# Patient Record
Sex: FEMALE | Race: WHITE | Employment: STUDENT | URBAN - NONMETROPOLITAN AREA
[De-identification: names, ages, dates, MRNs, and addresses within clinical notes are randomized per-mention and may not be internally consistent; named-entity substitution may affect disease eponyms.]

---

## 2021-05-18 ENCOUNTER — HOSPITAL ENCOUNTER (EMERGENCY)
Age: 26
Discharge: HOME | End: 2021-05-18
Payer: COMMERCIAL

## 2021-05-18 VITALS
OXYGEN SATURATION: 98 % | HEART RATE: 56 BPM | SYSTOLIC BLOOD PRESSURE: 130 MMHG | TEMPERATURE: 96.9 F | DIASTOLIC BLOOD PRESSURE: 74 MMHG | RESPIRATION RATE: 16 BRPM | WEIGHT: 230 LBS

## 2021-05-18 DIAGNOSIS — H66.91 ACUTE OTITIS MEDIA, RIGHT: ICD-10-CM

## 2021-05-18 DIAGNOSIS — H60.393 INFECTIVE OTITIS EXTERNA OF BOTH EARS: Primary | ICD-10-CM

## 2021-05-18 PROCEDURE — 99202 OFFICE O/P NEW SF 15 MIN: CPT | Performed by: NURSE PRACTITIONER

## 2021-05-18 PROCEDURE — 99203 OFFICE O/P NEW LOW 30 MIN: CPT

## 2021-05-18 RX ORDER — NEOMYCIN SULFATE, POLYMYXIN B SULFATE AND HYDROCORTISONE 10; 3.5; 1 MG/ML; MG/ML; [USP'U]/ML
3 SUSPENSION/ DROPS AURICULAR (OTIC) 4 TIMES DAILY
Qty: 1 BOTTLE | Refills: 0 | Status: SHIPPED | OUTPATIENT
Start: 2021-05-18 | End: 2021-05-25

## 2021-05-18 RX ORDER — AMOXICILLIN AND CLAVULANATE POTASSIUM 875; 125 MG/1; MG/1
1 TABLET, FILM COATED ORAL 2 TIMES DAILY
Qty: 20 TABLET | Refills: 0 | Status: SHIPPED | OUTPATIENT
Start: 2021-05-18 | End: 2021-05-28

## 2021-05-18 ASSESSMENT — ENCOUNTER SYMPTOMS
COUGH: 0
NAUSEA: 0
TROUBLE SWALLOWING: 0
DIARRHEA: 0
VOMITING: 0
RHINORRHEA: 0
EYE DISCHARGE: 0
EYE REDNESS: 0
SORE THROAT: 1
SHORTNESS OF BREATH: 0

## 2021-05-18 ASSESSMENT — PAIN DESCRIPTION - PAIN TYPE: TYPE: ACUTE PAIN

## 2021-05-18 ASSESSMENT — PAIN DESCRIPTION - LOCATION: LOCATION: EAR

## 2021-05-18 NOTE — ED PROVIDER NOTES
drugs.    PHYSICAL EXAM     ED TRIAGE VITALS  BP: 130/74, Temp: 96.9 °F (36.1 °C), Pulse: 56, Resp: 16, SpO2: 98 %  Physical Exam  Vitals and nursing note reviewed. Constitutional:       General: She is not in acute distress. Appearance: Normal appearance. She is well-developed. She is not ill-appearing, toxic-appearing or diaphoretic. HENT:      Head: Normocephalic and atraumatic. Right Ear: Hearing, ear canal and external ear normal. Swelling and tenderness present. No drainage. A middle ear effusion is present. No mastoid tenderness. No hemotympanum. Tympanic membrane is erythematous and bulging. Tympanic membrane is not perforated. Left Ear: Hearing, ear canal and external ear normal. Tenderness present. No drainage or swelling. A middle ear effusion is present. No mastoid tenderness. No hemotympanum. Tympanic membrane is not perforated, erythematous or bulging. Nose: Nose normal.      Mouth/Throat:      Mouth: Mucous membranes are moist.      Pharynx: Oropharynx is clear. Uvula midline. Tonsils: No tonsillar abscesses. Eyes:      General: No scleral icterus. Conjunctiva/sclera: Conjunctivae normal.      Right eye: Right conjunctiva is not injected. No hemorrhage. Left eye: Left conjunctiva is not injected. No hemorrhage. Neck:      Thyroid: No thyromegaly. Trachea: Trachea normal.   Cardiovascular:      Rate and Rhythm: Normal rate and regular rhythm. No extrasystoles are present. Chest Wall: PMI is not displaced. Heart sounds: Normal heart sounds. No murmur heard. No friction rub. No gallop. Pulmonary:      Effort: Pulmonary effort is normal. No respiratory distress. Breath sounds: Normal breath sounds. Musculoskeletal:      Cervical back: Normal range of motion and neck supple. Lymphadenopathy:      Head:      Right side of head: No submental, submandibular, tonsillar or occipital adenopathy.       Left side of head: No submental, submandibular, tonsillar or occipital adenopathy. Cervical: No cervical adenopathy. Upper Body:      Right upper body: No supraclavicular adenopathy. Left upper body: No supraclavicular adenopathy. Skin:     General: Skin is warm and dry. Capillary Refill: Capillary refill takes less than 2 seconds. Coloration: Skin is not pale. Findings: No rash. Comments: Skin warm and dry to touch, no rashes noted on exposed surfaces. Neurological:      Mental Status: She is alert and oriented to person, place, and time. She is not disoriented. Psychiatric:         Mood and Affect: Mood normal.         Behavior: Behavior is cooperative. DIAGNOSTIC RESULTS   Labs: No results found for this visit on 05/18/21. IMAGING:  No orders to display     URGENT CARE COURSE:     Vitals:    05/18/21 1825   BP: 130/74   Pulse: 56   Resp: 16   Temp: 96.9 °F (36.1 °C)   TempSrc: Temporal   SpO2: 98%   Weight: 230 lb (104.3 kg)       Medications - No data to display  PROCEDURES:  None  FINALIMPRESSION      1. Infective otitis externa of both ears    2. Acute otitis media, right        DISPOSITION/PLAN   DISPOSITION Decision To Discharge 05/18/2021 06:31:39 PM  Nontoxic, no distress. Exam consistent with bilateral otitis externa, right otitis media. TMs intact. Medications as prescribed. Daily yogurt/probiotic. If symptoms worsen return or go to ER. PATIENT REFERRED TO:  Palak Geiger DO  [address]  [phone]      Establish care as needed. Medications as prescribed. OTC medication for pain. If worse return or go to ER.     DISCHARGE MEDICATIONS:  Discharge Medication List as of 5/18/2021  6:32 PM      START taking these medications    Details   amoxicillin-clavulanate (AUGMENTIN) 875-125 MG per tablet Take 1 tablet by mouth 2 times daily for 10 days, Disp-20 tablet, R-0Normal      neomycin-polymyxin-hydrocortisone (CORTISPORIN) 3.5-47799-3 otic suspension Place 3 drops into both ears 4 times daily for 7 days, Disp-1 Bottle, R-0Normal           Discharge Medication List as of 5/18/2021  6:32 PM          GISELA Tobias CNP, APRN - CNP  05/18/21 1842

## 2023-10-04 ENCOUNTER — NURSE ONLY (OUTPATIENT)
Dept: LAB | Age: 28
End: 2023-10-04

## 2023-10-06 LAB
C. TRACHOMATIS DNA,THIN PREP: NEGATIVE
N. GONORRHOEAE DNA, THIN PREP: NEGATIVE
SOURCE: NORMAL
SOURCE: NORMAL
TRICHOMONAS VAGINALI, MOLECULAR: NEGATIVE

## 2023-10-12 LAB — CYTOLOGY THIN PREP PAP: NORMAL

## 2023-11-28 ENCOUNTER — NURSE ONLY (OUTPATIENT)
Dept: LAB | Age: 28
End: 2023-11-28

## 2023-11-30 LAB
BACTERIA UR CULT: ABNORMAL
ORGANISM: ABNORMAL

## 2024-01-09 ENCOUNTER — HOSPITAL ENCOUNTER (OUTPATIENT)
Dept: PEDIATRICS | Age: 29
Discharge: HOME | End: 2024-01-09
Payer: COMMERCIAL

## 2024-01-09 ENCOUNTER — HOSPITAL ENCOUNTER (OUTPATIENT)
Age: 29
Discharge: HOME | End: 2024-01-11
Attending: PEDIATRICS
Payer: COMMERCIAL

## 2024-01-09 VITALS
OXYGEN SATURATION: 98 % | DIASTOLIC BLOOD PRESSURE: 71 MMHG | TEMPERATURE: 97.1 F | SYSTOLIC BLOOD PRESSURE: 137 MMHG | RESPIRATION RATE: 18 BRPM | HEART RATE: 80 BPM | WEIGHT: 293 LBS | BODY MASS INDEX: 41.02 KG/M2 | HEIGHT: 71 IN

## 2024-01-09 DIAGNOSIS — Z87.74 HISTORY OF ATRIAL SEPTAL DEFECT REPAIR: Primary | ICD-10-CM

## 2024-01-09 DIAGNOSIS — Z87.74 HISTORY OF ATRIAL SEPTAL DEFECT REPAIR: ICD-10-CM

## 2024-01-09 PROCEDURE — 93325 DOPPLER ECHO COLOR FLOW MAPG: CPT

## 2024-01-09 PROCEDURE — 99214 OFFICE O/P EST MOD 30 MIN: CPT

## 2024-01-09 NOTE — DISCHARGE INSTRUCTIONS
Continue care with OB/GYN.  Call if questions or concerns, Dr. Clarke PH: [phone].  Discharged from Fetal-Cardiology clinic.

## 2024-01-30 ENCOUNTER — HOSPITAL ENCOUNTER (OUTPATIENT)
Dept: PHYSICAL THERAPY | Age: 29
Setting detail: THERAPIES SERIES
Discharge: HOME | End: 2024-01-30
Payer: COMMERCIAL

## 2024-01-30 PROCEDURE — 97161 PT EVAL LOW COMPLEX 20 MIN: CPT

## 2024-01-30 PROCEDURE — 97110 THERAPEUTIC EXERCISES: CPT

## 2024-01-30 PROCEDURE — 97530 THERAPEUTIC ACTIVITIES: CPT

## 2024-01-30 NOTE — PROGRESS NOTES
** PLEASE SIGN, DATE AND TIME CERTIFICATION BELOW AND RETURN TO [facility] OUTPATIENT REHABILITATION (FAX #: [phone]).  ATTEST/CO-SIGN IF ACCESSING VIA [employer].  THANK YOU.**    I certify that I have examined the patient below and determined that Physical Medicine and Rehabilitation service is necessary and that I approve the established plan of care for up to 90 days or as specifically noted.  Attestation, signature or co-signature of physician indicates approval of certification requirements.    ________________________ ____________ __________  Physician Signature   Date   Time  [facility]  PHYSICAL THERAPY  OUTPATIENT REHABILITATION - SPECIALIZED THERAPY SERVICES  [x] PELVIC HEALTH EVALUATION  [] DAILY NOTE  [] PROGRESS NOTE [] DISCHARGE NOTE    Date: 2024  Patient Name:  Tiff XIAO Current  : 1995  MRN: [id number]  CSN: [id number]    Referring Practitioner Wanda Hamilton MD   Diagnosis R10.3 Lower abdominal pain, unspecified  M25.552 Pain in left hip  Z33.1 Pregnant state, incidental   Treatment Diagnosis M62.58 - Muscle Wasting and Atrophy, nec, other site  M62.89 - Other Specified Disorders of Muscle  M62.81 - Muscle Weakness (Generalized), R10.30 - Lower Abdominal Pain, Unspecified, and R29.3 - Abnormal Posture   Date of Evaluation 24    Additional Pertinent History Atrial septal defect, ASD repair       Functional Outcome Measure Used Modified PAMELA   Functional Outcome Score 14/50 (24)       Insurance: Primary: Payor: [employer] /  /  / ,   Secondary:    Authorization Information: No precert required   Visit # 1, 1/10 for progress note   Visits Allowed: 30 VISITS CALENDAR YEAR -SOFTMAX - NONE USED    Recertification Date: 24   Physician Follow-Up:    Physician Orders:    History of Present Illness: Pt is 25 weeks pregnant and has reports of lower abdominal and L hip pain for the last 1 month. States that she will be lying in bed and \"get stuck\" and her

## 2024-02-23 ENCOUNTER — CARE COORDINATION (OUTPATIENT)
Dept: OTHER | Facility: CLINIC | Age: 29
End: 2024-02-23

## 2024-02-23 NOTE — CARE COORDINATION
review  Genetic Screening completed/reviewed-normal  MFM referral needed? No  Fetal movement-every day  Red flags: bleeding/leaking  Labor signs and symptoms-None  Birth planning:  planned   Maternity Classes? Already signed up       Patient given an opportunity to ask questions and does not have any further questions or concerns at this time. Were discharge instructions available to patient? Yes. Reviewed appropriate site of care based on symptoms and resources available to patient including: PCP  Specialist  Benefits related nurse triage line  Urgent care clinics  When to call 911  Trendlraging. The patient agrees to contact the OB/Gyn office for questions related to their healthcare.    Plan for follow-up call in 10-14 days based on severity of symptoms and risk factors.  Plan for next call: symptom management-any new s/s  self management-any new concerns?  follow up appointment-how did repeat OBV go?     Goals        Attends follow-up appointments as directed      Educate and encourage importance of FU for prevention of complications or disease;  Assess the patient's relationship with a PCP and next FU visit scheduled;  Discuss importance of adherence to treatment plan and follow up visits;  Identify any barriers in transportation or access to FU appointments.   Assist patient with making FU appointments as needed;   It's also a good idea to know your test results.  Keep a list of the medicines you take.       Patient will identify signs and symptoms requiring evaluation and intervention      Demonstrate how client is to evaluate contraction activity after discharge (e.g., lying down, tilted to the side with a pillow to the back, placing fingertips on the fundus for approximately 1 hour to note hardening or tightening of the uterus).  Identify signs and/or symptoms that should be reported immediately to the healthcare provider (e.g, sustained uterine contractions, clear drainage from vagina,

## 2024-02-26 ENCOUNTER — CARE COORDINATION (OUTPATIENT)
Dept: OTHER | Facility: CLINIC | Age: 29
End: 2024-02-26

## 2024-02-26 NOTE — CARE COORDINATION
ACM contacted patient to assist her in enrolling in Be Well, then Be Well With Baby (BWWB).   Patient was able to successfully enroll in Be Well and the BWWB program. She is appreciative of the call and assistance and will begin completing all modules through the BWWB program. She is agreeable to a routine [facility] follow-up call in about 4 weeks, and will call ACM with any needs in the meantime.       MARILYN Poon, RN  Associate Care Manager   Cell: [phone]  [email or web address]

## 2024-03-05 ENCOUNTER — HOSPITAL ENCOUNTER (OUTPATIENT)
Age: 29
Discharge: HOME | End: 2024-03-05
Payer: COMMERCIAL

## 2024-03-06 ENCOUNTER — APPOINTMENT (OUTPATIENT)
Dept: PHYSICAL THERAPY | Age: 29
End: 2024-03-06
Payer: COMMERCIAL

## 2024-03-09 PROCEDURE — S9442 BIRTHING CLASS: HCPCS

## 2024-03-13 ENCOUNTER — HOSPITAL ENCOUNTER (OUTPATIENT)
Dept: PHYSICAL THERAPY | Age: 29
Setting detail: THERAPIES SERIES
Discharge: HOME | End: 2024-03-13
Payer: COMMERCIAL

## 2024-03-13 PROCEDURE — 97530 THERAPEUTIC ACTIVITIES: CPT

## 2024-03-13 PROCEDURE — 97140 MANUAL THERAPY 1/> REGIONS: CPT

## 2024-03-13 NOTE — PROGRESS NOTES
as follows:    []  Hold pending physician visit  []  Discharge    Time In 8:00   Time Out 9:00   Timed Code Minutes: 60 min   Total Treatment Time: 60 min       Electronically Signed by: Shelby Cash PT

## 2024-03-20 ENCOUNTER — HOSPITAL ENCOUNTER (OUTPATIENT)
Dept: PHYSICAL THERAPY | Age: 29
Setting detail: THERAPIES SERIES
Discharge: HOME | End: 2024-03-20
Payer: COMMERCIAL

## 2024-03-20 PROCEDURE — 97530 THERAPEUTIC ACTIVITIES: CPT

## 2024-03-20 PROCEDURE — 97110 THERAPEUTIC EXERCISES: CPT

## 2024-03-20 PROCEDURE — 97140 MANUAL THERAPY 1/> REGIONS: CPT

## 2024-03-20 NOTE — PROGRESS NOTES
Patient limited by fatigue  []  Patient limited by pain   []  Patient limited by medical complications  []  Other:     Patient Education:   [x]  HEP/Education Completed: Patient to continue doing gross hip girdle stretching as noted above. To start hip alignment correction MET daily and hip adduction.  Handout provided.  [employer] Access Code:  []  No new Education completed  [x]  Reviewed Prior HEP      [x]  Patient verbalized and/or demonstrated understanding of education provided.  []  Patient unable to verbalize and/or demonstrate understanding of education provided.  Will continue education.  []  Barriers to learning: none    Assessment: Pt continues to report L posterolateral hip pain reynaldo with sit to stand transitions and with bending forward. Performed cupping to L TFL, proximal IT band, lumbar radha, piriformis, and gluteals with pt in sidelying. Performed STM to L lumbar para's, L glutes, L piri, L QL and R side as well today. Pt tolerated manual therapy well. Encouraged self MET correction for hip misalignment followed by hip adduction with ball. Pt would benefit from continued manual therapy and instruction in core strengthening to reduce radicular pain symptoms. Plan to start treatment with core strengthening next session.     Body Structures/Functions/Activity Limitations: Abdominal and core weakness, Decreased awareness of safe means of improving abdom strength and stability, Limited hip girdle flexibility, Abnormal posture with limited insight on optimal postural positions and habits, Impaired activity tolerance, Impaired ROM, Impaired strength, and Pain  Prognosis: Good    GOALS:  Patient Goal: to have less pain    Short Term Goals:  Time Frame: 6 weeks  Pt will report reduced pain with sit to stand transfers from 8/10 to 5/10 in order to improve ease of transitional movements throughout pregnancy.  Pt will be independent and complaint with HEP of gross hip girdle stretching to reduce pelvic

## 2024-03-27 ENCOUNTER — HOSPITAL ENCOUNTER (OUTPATIENT)
Dept: PHYSICAL THERAPY | Age: 29
Setting detail: THERAPIES SERIES
Discharge: HOME | End: 2024-03-27
Payer: COMMERCIAL

## 2024-03-27 PROCEDURE — 97140 MANUAL THERAPY 1/> REGIONS: CPT

## 2024-03-27 PROCEDURE — 97110 THERAPEUTIC EXERCISES: CPT

## 2024-03-27 NOTE — PROGRESS NOTES
Pain with [place] (Dyspareunia) 2-3/10 - central abdomen - sharp, aching    Painful Penetration Pain with both initial and deep penetration    Lubrication Needed no   Pain After [place] yes: will last for a few hours after         VITALS [] Deferred secondary to:   Height 5'11\"   Weight 282#       GENERAL ASSESSMENT   [] Deferred secondary to:   Palpation Tenderness to palp at L piri, L greater troch, L lumbar paraspinals and tender at L PSIS; unable to tolerate moderate pressure at pubic symphysis due to pain referring up central abdomen; tender at B iliopsoas with L>R   Observation Significant pain with supine positioning and with palpation at pubic symphysis and B psoas with L>R   Posture Increased Lumbar Lordosis    Range of Motion L piri, hamstring and psoas tightness and referred pain to central abdomen with movement and Lumbar WFL   Strength BLE WNL except 4+/5 L flexion but painful with L hip flexion and abduction and core 3/5   Gait WNL   Sensation WNL   Edema WNL   Balance/Fall History Denies balance or fall problems   Special Tests SLR: + on L for abdominal pain  S2,3,4: intact  KIT: + on L            TREATMENT   Precautions:    Pain:     \"X” in shaded column indicates activity completed today    “*\" next to exercise/intervention indicates progression   Modalities Parameters/  Location  Notes                     Manual Therapy Time/Technique  Notes   STM to L lumbar para's, L glutes, L piri, L QL 20 min x    Hip alignment 10 min      Cupping to L lumbar radha, glutes and TFL 5 min x Cupping with shayla pose and lateral shayla pose         Exercise/Intervention   Notes   PFM A&P; nature of condition; PT POC 2 min  x    SI belt 10 min  x                  Seated hamstring stretch 3x30 sec  x    Seated piri stretch (push and pull) 3x30 sec  x    Seated hip flexor stretch 3x30 sec  x    Seated on swiss ball rocking side to side x10  x Increased pain with circles and rocking forward/backward

## 2024-04-09 ENCOUNTER — CARE COORDINATION (OUTPATIENT)
Dept: OTHER | Facility: CLINIC | Age: 29
End: 2024-04-09

## 2024-04-09 NOTE — CARE COORDINATION
Patient Current Location: Home: [address] [place] [address]  [place] [address]    Last Discharge Facility       Date Complaint Diagnosis Description Type Department Provider    21 Otalgia Infective otitis externa of both ears [facility]             Maternity Care Manager contacted the patient by telephone to discuss the maternity management program.  Patient agrees to care management services at this time. Verified name and  with patient as identifiers.     Risk Factors Identified:  None      Needs to be reviewed by the provider   none         Method of communication with provider : none    Advance Care Planning:   Does patient have an Advance Directive:  reviewed and current.     Does patient have OB/Gyn Selected? Yes    Discussed follow up appointments. If no appointment was previously scheduled, appointment scheduling offered: Yes  [facility] follow up appointment(s):   Future Appointments   Date Time Provider Department Center   2024  8:00 AM Shelby Cash, PT [facility]   2024  8:00 AM Shelby Cash, SHERON [facility]     Non-[facility] follow up appointment(s): n/a    OB History:   OB History    Para Term  AB Living   1             SAB IAB Ectopic Molar Multiple Live Births                    # Outcome Date GA Lbr Vladimir/2nd Weight Sex Delivery Anes PTL Lv   1 Current                35w4d    Medication reconciliation was performed with patient, who verbalizes understanding of administration of home medications. Advised obtaining a 90-day supply of all daily and as-needed medications.     Barriers/Support system:  patient and spouse  Return to work planning? At 14 weeks PP      2nd and 3rd Trimester Focused Assessment:   Healthy behavior review  Genetic Screening completed/reviewed-N/A  MFM referral needed? No  Fetal movement-every day  Red flags: bleeding/leaking  Birth planning:  planned  Maternity Classes? Yes        Patient given an opportunity to ask

## 2024-04-10 ENCOUNTER — NURSE ONLY (OUTPATIENT)
Dept: LAB | Age: 29
End: 2024-04-10

## 2024-04-12 LAB — GP B STREP VAG+RECTUM QL CULT: NORMAL

## 2024-04-17 ENCOUNTER — HOSPITAL ENCOUNTER (OUTPATIENT)
Dept: PHYSICAL THERAPY | Age: 29
Setting detail: THERAPIES SERIES
End: 2024-04-17
Payer: COMMERCIAL

## 2024-04-24 ENCOUNTER — HOSPITAL ENCOUNTER (OUTPATIENT)
Dept: PHYSICAL THERAPY | Age: 29
Setting detail: THERAPIES SERIES
Discharge: HOME | End: 2024-04-24
Payer: COMMERCIAL

## 2024-04-24 PROCEDURE — 97530 THERAPEUTIC ACTIVITIES: CPT

## 2024-04-24 PROCEDURE — 97110 THERAPEUTIC EXERCISES: CPT

## 2024-04-24 NOTE — PROGRESS NOTES
break, changing positions, icing hip   Maximum Intensity 5/10 - most often with sitting to standing or rolling side to side in bed   Best Intensity 1/10 - is never gone; is always slightly present    Todays Rating 2/10   Other/Function      History of Abuse:No history of physical, emotional or sexual abuse reported    SEXUAL /MENSTRUAL HISTORY [] Deferred secondary to:    Age of First Menstrual Period 12-13   Are Cycles Regular yes    Pain During Menses no   Birth Control no   Number of Pregnancies 1 - currently 25 weeks   Number of Vaginal Deliveries 0   Number of Caesarean Deliveries 0       BLADDER ASSESSMENT [] Deferred secondary to:   Daily Fluid Ingestion: 1 gallon water, occasional pop, occasional coffee   Urination Frequency Times/Day: every 3 hours  Times/Night: 1   Volume Medium   Urge Sensation Normal    SYMPTOM QUESTIONNAIRE   Loses Urine Upon: With urge present while laughing   Incontinence Volume: Small   Frequency of Leakage: Only on 1 occassion   Wets the Bed: no   Burning/Pain with Urination: no   Difficulty Starting a Stream of Urine: no   Incomplete Emptying Occasional - voids then 10-15 mins later may have to void again    Strain to Empty Bladder: no   “Falling Out” Feeling: no   Urinate more than 7 times/day: no   Use a form of Leakage Protection: no   Restrict Fluid Intake: no   Stream Strength Average       BOWEL ASSESSMENT [] Deferred secondary to:   Frequency: Daily - now every couple of days   Most Common Stool Consistency: [place] 4 - will be more loose if she takes a stool softener   SYMPTOM QUESTIONNAIRE   Strain to have a BM: no   Include fiber in your diet: yes: whole grains   Take laxatives/enemas regularly: yes: will take a stool softener if she has not gone in a couple of days   Pain with BM: no   Strong urge to have BM: no   Leak/Stain Feces: no   Diarrhea often: no         SEXUAL ASSESSMENT [] Deferred secondary to:   Sexually Active yes   Pain with [place] (Dyspareunia)

## 2024-05-04 PROBLEM — O26.90 PREGNANCY COMPLICATION, ANTEPARTUM: Status: ACTIVE | Noted: 2024-05-04

## 2024-05-15 ENCOUNTER — ANESTHESIA (OUTPATIENT)
Dept: LABOR AND DELIVERY | Age: 29
End: 2024-05-15
Payer: COMMERCIAL

## 2024-05-15 ENCOUNTER — ANESTHESIA EVENT (OUTPATIENT)
Dept: LABOR AND DELIVERY | Age: 29
End: 2024-05-15
Payer: COMMERCIAL

## 2024-05-15 ENCOUNTER — HOSPITAL ENCOUNTER (INPATIENT)
Age: 29
LOS: 2 days | Discharge: HOME | End: 2024-05-17
Attending: OBSTETRICS & GYNECOLOGY | Admitting: OBSTETRICS & GYNECOLOGY
Payer: COMMERCIAL

## 2024-05-15 ENCOUNTER — APPOINTMENT (OUTPATIENT)
Dept: LABOR AND DELIVERY | Age: 29
End: 2024-05-15
Payer: COMMERCIAL

## 2024-05-15 PROBLEM — Z34.90 ENCOUNTER FOR INDUCTION OF LABOR: Status: ACTIVE | Noted: 2024-05-15

## 2024-05-15 LAB
ABO: NORMAL
ALBUMIN SERPL BCG-MCNC: 3.5 G/DL (ref 3.5–5.1)
ALP SERPL-CCNC: 189 U/L (ref 38–126)
ALT SERPL W/O P-5'-P-CCNC: 16 U/L (ref 11–66)
AMPHETAMINES UR QL SCN: NEGATIVE
ANION GAP SERPL CALC-SCNC: 15 MEQ/L (ref 8–16)
ANTIBODY SCREEN: NORMAL
AST SERPL-CCNC: 19 U/L (ref 5–40)
BARBITURATES UR QL SCN: NEGATIVE
BASOPHILS ABSOLUTE: 0 THOU/MM3 (ref 0–0.1)
BASOPHILS NFR BLD AUTO: 0.2 %
BENZODIAZ UR QL SCN: NEGATIVE
BILIRUB SERPL-MCNC: 0.5 MG/DL (ref 0.3–1.2)
BUN SERPL-MCNC: 10 MG/DL (ref 7–22)
BZE UR QL SCN: NEGATIVE
CALCIUM SERPL-MCNC: 9 MG/DL (ref 8.5–10.5)
CANNABINOIDS UR QL SCN: NEGATIVE
CHLORIDE SERPL-SCNC: 105 MEQ/L (ref 98–111)
CO2 SERPL-SCNC: 18 MEQ/L (ref 23–33)
CREAT SERPL-MCNC: 0.7 MG/DL (ref 0.4–1.2)
CREAT UR-MCNC: 164.3 MG/DL
DEPRECATED RDW RBC AUTO: 41.3 FL (ref 35–45)
EOSINOPHIL NFR BLD AUTO: 1.1 %
EOSINOPHILS ABSOLUTE: 0.1 THOU/MM3 (ref 0–0.4)
ERYTHROCYTE [DISTWIDTH] IN BLOOD BY AUTOMATED COUNT: 14.1 % (ref 11.5–14.5)
FENTANYL: NEGATIVE
GFR SERPL CREATININE-BSD FRML MDRD: > 90 ML/MIN/1.73M2
GLUCOSE SERPL-MCNC: 100 MG/DL (ref 70–108)
HCT VFR BLD AUTO: 40.1 % (ref 37–47)
HGB BLD-MCNC: 13.4 GM/DL (ref 12–16)
IMM GRANULOCYTES # BLD AUTO: 0.17 THOU/MM3 (ref 0–0.07)
IMM GRANULOCYTES NFR BLD AUTO: 1.4 %
LYMPHOCYTES ABSOLUTE: 2.6 THOU/MM3 (ref 1–4.8)
LYMPHOCYTES NFR BLD AUTO: 21.9 %
MCH RBC QN AUTO: 27.3 PG (ref 26–33)
MCHC RBC AUTO-ENTMCNC: 33.4 GM/DL (ref 32.2–35.5)
MCV RBC AUTO: 81.8 FL (ref 81–99)
MONOCYTES ABSOLUTE: 0.8 THOU/MM3 (ref 0.4–1.3)
MONOCYTES NFR BLD AUTO: 6.2 %
NEUTROPHILS ABSOLUTE: 8.4 THOU/MM3 (ref 1.8–7.7)
NEUTROPHILS NFR BLD AUTO: 69.2 %
NRBC BLD AUTO-RTO: 0 /100 WBC
OPIATES UR QL SCN: NEGATIVE
OXYCODONE: NEGATIVE
PCP UR QL SCN: NEGATIVE
PLATELET # BLD AUTO: 242 THOU/MM3 (ref 130–400)
PMV BLD AUTO: 11.3 FL (ref 9.4–12.4)
POTASSIUM SERPL-SCNC: 4.3 MEQ/L (ref 3.5–5.2)
PROT SERPL-MCNC: 6.1 G/DL (ref 6.1–8)
PROT UR-MCNC: 21 MG/DL
PROT/CREAT 24H UR: 0.13 MG/G{CREAT}
RBC # BLD AUTO: 4.9 MILL/MM3 (ref 4.2–5.4)
RH FACTOR: NORMAL
RPR SER QL: NONREACTIVE
SODIUM SERPL-SCNC: 138 MEQ/L (ref 135–145)
WBC # BLD AUTO: 12.1 THOU/MM3 (ref 4.8–10.8)

## 2024-05-15 PROCEDURE — 80053 COMPREHEN METABOLIC PANEL: CPT

## 2024-05-15 PROCEDURE — 85025 COMPLETE CBC W/AUTO DIFF WBC: CPT

## 2024-05-15 PROCEDURE — 82570 ASSAY OF URINE CREATININE: CPT

## 2024-05-15 PROCEDURE — 2500000003 HC RX 250 WO HCPCS: Performed by: ANESTHESIOLOGY

## 2024-05-15 PROCEDURE — 86850 RBC ANTIBODY SCREEN: CPT

## 2024-05-15 PROCEDURE — 86900 BLOOD TYPING SEROLOGIC ABO: CPT

## 2024-05-15 PROCEDURE — 80307 DRUG TEST PRSMV CHEM ANLYZR: CPT

## 2024-05-15 PROCEDURE — 86592 SYPHILIS TEST NON-TREP QUAL: CPT

## 2024-05-15 PROCEDURE — 36415 COLL VENOUS BLD VENIPUNCTURE: CPT

## 2024-05-15 PROCEDURE — 6370000000 HC RX 637 (ALT 250 FOR IP): Performed by: OBSTETRICS & GYNECOLOGY

## 2024-05-15 PROCEDURE — 1220000001 HC SEMI PRIVATE L&D R&B

## 2024-05-15 PROCEDURE — 3700000025 EPIDURAL BLOCK: Performed by: ANESTHESIOLOGY

## 2024-05-15 PROCEDURE — 86901 BLOOD TYPING SEROLOGIC RH(D): CPT

## 2024-05-15 PROCEDURE — 2580000003 HC RX 258: Performed by: OBSTETRICS & GYNECOLOGY

## 2024-05-15 PROCEDURE — 6360000002 HC RX W HCPCS: Performed by: OBSTETRICS & GYNECOLOGY

## 2024-05-15 PROCEDURE — 84156 ASSAY OF PROTEIN URINE: CPT

## 2024-05-15 PROCEDURE — 6360000002 HC RX W HCPCS: Performed by: REGISTERED NURSE

## 2024-05-15 RX ORDER — FAMOTIDINE 20 MG/1
20 TABLET, FILM COATED ORAL 2 TIMES DAILY
COMMUNITY

## 2024-05-15 RX ORDER — ROPIVACAINE HYDROCHLORIDE 2 MG/ML
INJECTION, SOLUTION EPIDURAL; INFILTRATION; PERINEURAL
Status: COMPLETED
Start: 2024-05-15 | End: 2024-05-15

## 2024-05-15 RX ORDER — TRANEXAMIC ACID 10 MG/ML
1000 INJECTION, SOLUTION INTRAVENOUS
Status: DISCONTINUED | OUTPATIENT
Start: 2024-05-15 | End: 2024-05-16 | Stop reason: HOSPADM

## 2024-05-15 RX ORDER — SODIUM CHLORIDE 9 MG/ML
INJECTION, SOLUTION INTRAVENOUS PRN
Status: DISCONTINUED | OUTPATIENT
Start: 2024-05-15 | End: 2024-05-16 | Stop reason: HOSPADM

## 2024-05-15 RX ORDER — METHYLERGONOVINE MALEATE 0.2 MG/ML
200 INJECTION INTRAVENOUS PRN
Status: DISCONTINUED | OUTPATIENT
Start: 2024-05-15 | End: 2024-05-16 | Stop reason: HOSPADM

## 2024-05-15 RX ORDER — OXYTOCIN/0.9 % SODIUM CHLORIDE 30/500 ML
87.3 PLASTIC BAG, INJECTION (ML) INTRAVENOUS PRN
Status: COMPLETED | OUTPATIENT
Start: 2024-05-15 | End: 2024-05-16

## 2024-05-15 RX ORDER — DIPHENHYDRAMINE HCL 25 MG
25 TABLET ORAL EVERY 4 HOURS PRN
Status: DISCONTINUED | OUTPATIENT
Start: 2024-05-15 | End: 2024-05-16 | Stop reason: HOSPADM

## 2024-05-15 RX ORDER — ACETAMINOPHEN 325 MG/1
650 TABLET ORAL EVERY 4 HOURS PRN
Status: DISCONTINUED | OUTPATIENT
Start: 2024-05-15 | End: 2024-05-16 | Stop reason: HOSPADM

## 2024-05-15 RX ORDER — ONDANSETRON 2 MG/ML
4 INJECTION INTRAMUSCULAR; INTRAVENOUS EVERY 6 HOURS PRN
Status: DISCONTINUED | OUTPATIENT
Start: 2024-05-15 | End: 2024-05-16 | Stop reason: HOSPADM

## 2024-05-15 RX ORDER — ONDANSETRON 4 MG/1
8 TABLET, ORALLY DISINTEGRATING ORAL EVERY 6 HOURS PRN
Status: DISCONTINUED | OUTPATIENT
Start: 2024-05-15 | End: 2024-05-16

## 2024-05-15 RX ORDER — DOCUSATE SODIUM 100 MG/1
100 CAPSULE, LIQUID FILLED ORAL 2 TIMES DAILY PRN
Status: DISCONTINUED | OUTPATIENT
Start: 2024-05-15 | End: 2024-05-16 | Stop reason: HOSPADM

## 2024-05-15 RX ORDER — SODIUM CHLORIDE, SODIUM LACTATE, POTASSIUM CHLORIDE, CALCIUM CHLORIDE 600; 310; 30; 20 MG/100ML; MG/100ML; MG/100ML; MG/100ML
INJECTION, SOLUTION INTRAVENOUS CONTINUOUS
Status: DISCONTINUED | OUTPATIENT
Start: 2024-05-15 | End: 2024-05-16 | Stop reason: HOSPADM

## 2024-05-15 RX ORDER — SODIUM CHLORIDE, SODIUM LACTATE, POTASSIUM CHLORIDE, AND CALCIUM CHLORIDE .6; .31; .03; .02 G/100ML; G/100ML; G/100ML; G/100ML
500 INJECTION, SOLUTION INTRAVENOUS PRN
Status: DISCONTINUED | OUTPATIENT
Start: 2024-05-15 | End: 2024-05-16 | Stop reason: HOSPADM

## 2024-05-15 RX ORDER — FENTANYL CITRATE 50 UG/ML
100 INJECTION, SOLUTION INTRAMUSCULAR; INTRAVENOUS
Status: DISCONTINUED | OUTPATIENT
Start: 2024-05-15 | End: 2024-05-16 | Stop reason: HOSPADM

## 2024-05-15 RX ORDER — DIPHENHYDRAMINE HYDROCHLORIDE 50 MG/ML
25 INJECTION INTRAMUSCULAR; INTRAVENOUS EVERY 4 HOURS PRN
Status: DISCONTINUED | OUTPATIENT
Start: 2024-05-15 | End: 2024-05-16 | Stop reason: HOSPADM

## 2024-05-15 RX ORDER — OXYCODONE HYDROCHLORIDE 5 MG/1
5 TABLET ORAL EVERY 4 HOURS PRN
Status: DISCONTINUED | OUTPATIENT
Start: 2024-05-15 | End: 2024-05-16 | Stop reason: HOSPADM

## 2024-05-15 RX ORDER — SODIUM CHLORIDE 0.9 % (FLUSH) 0.9 %
5-40 SYRINGE (ML) INJECTION EVERY 12 HOURS SCHEDULED
Status: DISCONTINUED | OUTPATIENT
Start: 2024-05-15 | End: 2024-05-16 | Stop reason: HOSPADM

## 2024-05-15 RX ORDER — SODIUM CHLORIDE 0.9 % (FLUSH) 0.9 %
5-40 SYRINGE (ML) INJECTION PRN
Status: DISCONTINUED | OUTPATIENT
Start: 2024-05-15 | End: 2024-05-16 | Stop reason: HOSPADM

## 2024-05-15 RX ORDER — CARBOPROST TROMETHAMINE 250 UG/ML
250 INJECTION, SOLUTION INTRAMUSCULAR PRN
Status: DISCONTINUED | OUTPATIENT
Start: 2024-05-15 | End: 2024-05-16 | Stop reason: HOSPADM

## 2024-05-15 RX ORDER — ROPIVACAINE HYDROCHLORIDE 2 MG/ML
INJECTION, SOLUTION EPIDURAL; INFILTRATION; PERINEURAL PRN
Status: DISCONTINUED | OUTPATIENT
Start: 2024-05-15 | End: 2024-05-16 | Stop reason: SDUPTHER

## 2024-05-15 RX ORDER — MISOPROSTOL 200 UG/1
1000 TABLET ORAL PRN
Status: DISCONTINUED | OUTPATIENT
Start: 2024-05-15 | End: 2024-05-16

## 2024-05-15 RX ORDER — OXYTOCIN/0.9 % SODIUM CHLORIDE 30/500 ML
1-20 PLASTIC BAG, INJECTION (ML) INTRAVENOUS CONTINUOUS
Status: DISCONTINUED | OUTPATIENT
Start: 2024-05-15 | End: 2024-05-16 | Stop reason: HOSPADM

## 2024-05-15 RX ORDER — NALOXONE HYDROCHLORIDE 0.4 MG/ML
INJECTION, SOLUTION INTRAMUSCULAR; INTRAVENOUS; SUBCUTANEOUS PRN
Status: DISCONTINUED | OUTPATIENT
Start: 2024-05-15 | End: 2024-05-16 | Stop reason: HOSPADM

## 2024-05-15 RX ORDER — SODIUM CHLORIDE, SODIUM LACTATE, POTASSIUM CHLORIDE, AND CALCIUM CHLORIDE .6; .31; .03; .02 G/100ML; G/100ML; G/100ML; G/100ML
1000 INJECTION, SOLUTION INTRAVENOUS PRN
Status: DISCONTINUED | OUTPATIENT
Start: 2024-05-15 | End: 2024-05-16 | Stop reason: HOSPADM

## 2024-05-15 RX ORDER — ONDANSETRON 2 MG/ML
8 INJECTION INTRAMUSCULAR; INTRAVENOUS EVERY 6 HOURS PRN
Status: DISCONTINUED | OUTPATIENT
Start: 2024-05-15 | End: 2024-05-16

## 2024-05-15 RX ORDER — TERBUTALINE SULFATE 1 MG/ML
0.25 INJECTION, SOLUTION SUBCUTANEOUS
Status: DISCONTINUED | OUTPATIENT
Start: 2024-05-15 | End: 2024-05-16 | Stop reason: HOSPADM

## 2024-05-15 RX ORDER — SEVOFLURANE 250 ML/250ML
1 LIQUID RESPIRATORY (INHALATION) CONTINUOUS PRN
Status: DISCONTINUED | OUTPATIENT
Start: 2024-05-15 | End: 2024-05-16 | Stop reason: HOSPADM

## 2024-05-15 RX ADMIN — SODIUM CHLORIDE, POTASSIUM CHLORIDE, SODIUM LACTATE AND CALCIUM CHLORIDE: 600; 310; 30; 20 INJECTION, SOLUTION INTRAVENOUS at 19:10

## 2024-05-15 RX ADMIN — SODIUM CHLORIDE, POTASSIUM CHLORIDE, SODIUM LACTATE AND CALCIUM CHLORIDE: 600; 310; 30; 20 INJECTION, SOLUTION INTRAVENOUS at 05:26

## 2024-05-15 RX ADMIN — ACETAMINOPHEN 650 MG: 325 TABLET ORAL at 09:45

## 2024-05-15 RX ADMIN — Medication 18 ML/HR: at 12:40

## 2024-05-15 RX ADMIN — Medication 1 MILLI-UNITS/MIN: at 06:07

## 2024-05-15 RX ADMIN — ONDANSETRON 8 MG: 2 INJECTION INTRAMUSCULAR; INTRAVENOUS at 20:35

## 2024-05-15 RX ADMIN — ACETAMINOPHEN 650 MG: 325 TABLET ORAL at 18:40

## 2024-05-15 RX ADMIN — ROPIVACAINE HYDROCHLORIDE 10 ML: 2 INJECTION, SOLUTION EPIDURAL; INFILTRATION at 12:38

## 2024-05-15 ASSESSMENT — PAIN SCALES - GENERAL
PAINLEVEL_OUTOF10: 2
PAINLEVEL_OUTOF10: 3

## 2024-05-15 NOTE — FLOWSHEET NOTE
Dr. Hamilton phoned unit. Notified that patient has arrived for morning induction.  reactive. Occasional contractions are occurring. SVE 2-3/70/-2 vertex. Pitocin at 1mL/hr. Upon arrival, patient is very clammy and anxious. Blood pressure on admission was 143/92. Blood pressure cuff adjusted and switched arms. Blood pressures have ranged anywhere from 153/89, 149/90, 159/93, 137/83. Dr. Hamilton ordered a protein/creatine ratio and CMP. Will update with results.

## 2024-05-15 NOTE — PROGRESS NOTES
Department of Obstetrics and Gynecology   Progress Note    Date of Admission: 5/15/2024  Hospital Day:  Hospital Day: 1      Subjective:    Membranes: Color: clear    Pain:  none with epidural    Vaginal Drainage:color clear    Bleeding: minimal    Fetal Movement: the patients states that the baby moves as usual        Objective:   Vitals:    05/15/24 1350 05/15/24 1410 05/15/24 1440 05/15/24 1510   BP:  133/72 138/78 137/83   Pulse:  70 68 77   Resp:       Temp: 96.8 °F (36 °C)      TempSrc: Temporal      SpO2: 100% 100% 99% 99%   Weight:       Height:           Physical Examination: General appearance - alert, well appearing, and in no distress  Mental status - alert, oriented to person, place, and time  Extremities - no pedal edema, no clubbing or cyanosis    Lab Results   Component Value Date    WBC 12.1 (H) 05/15/2024    HGB 13.4 05/15/2024    HCT 40.1 05/15/2024     05/15/2024    ALT 16 05/15/2024    AST 19 05/15/2024     05/15/2024    K 4.3 05/15/2024     05/15/2024    CREATININE 0.7 05/15/2024    BUN 10 05/15/2024    CO2 18 (L) 05/15/2024           ASSESSMENT AND PLAN:  Patient is a 28y.o. G 1 P 0 at 40 w 5 d present for IOL due to term gestation.  GBS negative  AROM clear  FHTs category 1  Cervix 6/90/-2  Contractions q2-3min  Continue current care  Anticipate      Freddie Peace MD 5/15/2024 4:07 PM

## 2024-05-15 NOTE — ANESTHESIA PROCEDURE NOTES
Epidural Block    Patient location during procedure: OB  Start time: 5/15/2024 12:22 PM  End time: 5/15/2024 12:40 PM  Reason for block: labor epidural  Staffing  Performed: resident/CRNA   Anesthesiologist: Deon Waldrop MD  Resident/CRNA: Pradeep Heredia APRN - CRNA  Performed by: Pradeep Heredia APRN - CRNA  Authorized by: Deon Waldrop MD    Epidural  Patient position: sitting  Prep: ChloraPrep and site prepped and draped  Patient monitoring: cardiac monitor, continuous pulse ox and frequent blood pressure checks  Approach: midline  Location: L3-4  Injection technique: SKY air  Guidance: paresthesia technique  Provider prep: sterile gloves and mask  Needle  Needle type: Tuohy   Needle gauge: 18 G  Needle length: 3.5 in  Needle insertion depth: 8 cm  Catheter type: side hole  Catheter at skin depth: 15 cm  Test dose: negativeCatheter Secured: tegaderm and tape  Assessment  Hemodynamics: stable  Attempts: 2  Outcomes: uncomplicated and patient tolerated procedure well  Preanesthetic Checklist  Completed: patient identified, IV checked, site marked, risks and benefits discussed, surgical/procedural consents, equipment checked, pre-op evaluation, timeout performed, anesthesia consent given, oxygen available, monitors applied/VS acknowledged, fire risk safety assessment completed and verbalized and blood product R/B/A discussed and consented

## 2024-05-15 NOTE — FLOWSHEET NOTE
05/15/24 1915   Provider Notification   Reason for Communication Status Update   Name of Team Member Notified Joy   Treatment Team Role Attending Provider   Method of Communication Face to face     Notified of current SVE

## 2024-05-15 NOTE — FLOWSHEET NOTE
Patient is wanting to try and labor without an epidural, patient did ask about pain relief options, all options dicussed and questions answered. Patient requesting Tylenol at this time.

## 2024-05-15 NOTE — ANESTHESIA PRE PROCEDURE
Department of Anesthesiology  Preprocedure Note       Name:  Tiff XIAO Current   Age:  28 y.o.  :  1995                                          MRN:  [id number]         Date:  5/15/2024      Surgeon: * No surgeons listed *    Procedure: * No procedures listed *    Medications prior to admission:   Prior to Admission medications    Medication Sig Start Date End Date Taking? Authorizing Provider   famotidine (PEPCID) 20 MG tablet Take 1 tablet by mouth 2 times daily   Yes ProviderJaqueline MD   Prenatal Vit-Fe Fumarate-FA (PRENATAL VITAMIN PO) Take by mouth daily    ProviderJaqueline MD       Current medications:    Current Facility-Administered Medications   Medication Dose Route Frequency Provider Last Rate Last Admin    oxytocin (PITOCIN) 30 units in 500 mL infusion  1-20 colt-units/min IntraVENous Continuous Wanda Hamilton MD 8 mL/hr at 05/15/24 1143 8 colt-units/min at 05/15/24 1143    terbutaline (BRETHINE) injection 0.25 mg  0.25 mg SubCUTAneous Once PRN Wanda Hamilton MD        lactated ringers IV soln infusion   IntraVENous Continuous Wanda Hamilton  mL/hr at 05/15/24 1143 Rate Verify at 05/15/24 1143    lactated ringers bolus 500 mL  500 mL IntraVENous PRN Wanda Hamilton MD        Or    lactated ringers bolus 1,000 mL  1,000 mL IntraVENous PRN Wanda Hamilton MD        sodium chloride flush 0.9 % injection 5-40 mL  5-40 mL IntraVENous 2 times per day Wanda Hamilton MD        sodium chloride flush 0.9 % injection 5-40 mL  5-40 mL IntraVENous PRN Wanda Hamilton MD        0.9 % sodium chloride infusion   IntraVENous PRN Wanda Hamilton MD        fentaNYL (SUBLIMAZE) injection 100 mcg  100 mcg IntraVENous Q1H PRN Wanda Hamilton MD        nitrous oxide 50% inhalation 1 each  1 each Inhalation Continuous PRN Wanda Hamilton MD        ondansetron (ZOFRAN) injection 8 mg  8 mg IntraVENous Q6H PRN Wanda Hamilton MD        Or    ondansetron (ZOFRAN-ODT)

## 2024-05-15 NOTE — FLOWSHEET NOTE
Dr. Hamilotn at bedside for AROM,  Discussed internal monitors with patient and patient is agreeable to them, FSE placed, IUPC placed and moderate amount of clear fluid noted at 946.

## 2024-05-15 NOTE — H&P
[facility]  History and Physical Update    Pt Name: Tiff XIAO Current  MRN: [id number]  YOB: 1995  Date of evaluation: 5/15/2024    [x] I have examined the patient and reviewed the H&P/Consult and there are no changes to the patient or plans.    29yo  at 40/5 for IOL due to term gestation. CE: 4/70/-2. BPs are mild range. AROM with clear fluid. IUPC/FSE placed without difficulty. Cat 1 tracing. IOL via pitocin. GBS neg. PreE labs are neg.    [] I have examined the patient and reviewed the H&P/Consult and have noted the following changes:       Discussion with the patient and/ or family for proposed care, treatment, services; benefits, risks, side effects; likelihood of achieving goals and potential problems that may occur during recuperation was had and all questions were answered.  Discussion with the patient and/ or family of reasonable alternatives to the proposed care, treatment, services and the discussion of the risks, benefits, side effects related to the alternatives and the risk related to not receiving the proposed care treatment services was also had and all questions were answered.    If this is for an elective surgical procedure then The patient was counseled at length about the risks of cr Covid-19 during their perioperative period and any recovery window from their procedure.  The patient was made aware that cr Covid-19  may worsen their prognosis for recovering from their procedure  and lend to a higher morbidity and/or mortality risk.  All material risks, benefits, and reasonable alternatives including postponing the procedure were discussed. The patient  does wish to proceed with the procedure at this time.             Wanda Hamilton MD,MD  Electronically signed 5/15/2024 at 8:41 AM

## 2024-05-15 NOTE — FLOWSHEET NOTE
Patient of Dr. Hamilton, , 40+5, arrived to unit for scheduled induction of labor. Patient states that she has not felt fetal movement this morning, last big movement was last night. Patient denies contractions, leaking of fluid, and vaginal bleeding. Patient oriented to room. FOB at patient's side. Patient to bathroom to void, informed of maternal drug testing policy in place on all laboring patients. Verbal consent received, paper consent to be signed and urine to be sent.

## 2024-05-15 NOTE — FLOWSHEET NOTE
Spoke with Dr. Hamilton on the phone and updated on VE, FHTs and contractions, will continue to work towards delivery at this time.

## 2024-05-16 PROCEDURE — 1220000000 HC SEMI PRIVATE OB R&B

## 2024-05-16 PROCEDURE — 6370000000 HC RX 637 (ALT 250 FOR IP): Performed by: OBSTETRICS & GYNECOLOGY

## 2024-05-16 PROCEDURE — 3E033VJ INTRODUCTION OF OTHER HORMONE INTO PERIPHERAL VEIN, PERCUTANEOUS APPROACH: ICD-10-PCS | Performed by: OBSTETRICS & GYNECOLOGY

## 2024-05-16 PROCEDURE — 2580000003 HC RX 258: Performed by: OBSTETRICS & GYNECOLOGY

## 2024-05-16 PROCEDURE — 6360000002 HC RX W HCPCS: Performed by: OBSTETRICS & GYNECOLOGY

## 2024-05-16 PROCEDURE — 7200000001 HC VAGINAL DELIVERY

## 2024-05-16 PROCEDURE — 10907ZC DRAINAGE OF AMNIOTIC FLUID, THERAPEUTIC FROM PRODUCTS OF CONCEPTION, VIA NATURAL OR ARTIFICIAL OPENING: ICD-10-PCS | Performed by: OBSTETRICS & GYNECOLOGY

## 2024-05-16 PROCEDURE — 0KQM0ZZ REPAIR PERINEUM MUSCLE, OPEN APPROACH: ICD-10-PCS | Performed by: OBSTETRICS & GYNECOLOGY

## 2024-05-16 RX ORDER — SODIUM CHLORIDE 0.9 % (FLUSH) 0.9 %
5-40 SYRINGE (ML) INJECTION PRN
Status: DISCONTINUED | OUTPATIENT
Start: 2024-05-16 | End: 2024-05-17 | Stop reason: HOSPADM

## 2024-05-16 RX ORDER — CARBOPROST TROMETHAMINE 250 UG/ML
250 INJECTION, SOLUTION INTRAMUSCULAR PRN
Status: DISCONTINUED | OUTPATIENT
Start: 2024-05-16 | End: 2024-05-17 | Stop reason: HOSPADM

## 2024-05-16 RX ORDER — SODIUM CHLORIDE 0.9 % (FLUSH) 0.9 %
5-40 SYRINGE (ML) INJECTION EVERY 12 HOURS SCHEDULED
Status: DISCONTINUED | OUTPATIENT
Start: 2024-05-16 | End: 2024-05-17 | Stop reason: HOSPADM

## 2024-05-16 RX ORDER — IBUPROFEN 800 MG/1
800 TABLET ORAL EVERY 8 HOURS
Status: DISCONTINUED | OUTPATIENT
Start: 2024-05-16 | End: 2024-05-17 | Stop reason: HOSPADM

## 2024-05-16 RX ORDER — METHYLERGONOVINE MALEATE 0.2 MG/ML
200 INJECTION INTRAVENOUS PRN
Status: DISCONTINUED | OUTPATIENT
Start: 2024-05-16 | End: 2024-05-17 | Stop reason: HOSPADM

## 2024-05-16 RX ORDER — SODIUM CHLORIDE, SODIUM LACTATE, POTASSIUM CHLORIDE, CALCIUM CHLORIDE 600; 310; 30; 20 MG/100ML; MG/100ML; MG/100ML; MG/100ML
INJECTION, SOLUTION INTRAVENOUS CONTINUOUS
Status: DISCONTINUED | OUTPATIENT
Start: 2024-05-16 | End: 2024-05-17 | Stop reason: HOSPADM

## 2024-05-16 RX ORDER — TRANEXAMIC ACID 10 MG/ML
1000 INJECTION, SOLUTION INTRAVENOUS
Status: ACTIVE | OUTPATIENT
Start: 2024-05-16 | End: 2024-05-17

## 2024-05-16 RX ORDER — MODIFIED LANOLIN
OINTMENT (GRAM) TOPICAL PRN
Status: DISCONTINUED | OUTPATIENT
Start: 2024-05-16 | End: 2024-05-17 | Stop reason: HOSPADM

## 2024-05-16 RX ORDER — IBUPROFEN 800 MG/1
800 TABLET ORAL EVERY 8 HOURS SCHEDULED
Status: DISCONTINUED | OUTPATIENT
Start: 2024-05-16 | End: 2024-05-16

## 2024-05-16 RX ORDER — BISACODYL 10 MG
10 SUPPOSITORY, RECTAL RECTAL DAILY PRN
Status: DISCONTINUED | OUTPATIENT
Start: 2024-05-16 | End: 2024-05-17 | Stop reason: HOSPADM

## 2024-05-16 RX ORDER — SODIUM CHLORIDE 9 MG/ML
INJECTION, SOLUTION INTRAVENOUS PRN
Status: DISCONTINUED | OUTPATIENT
Start: 2024-05-16 | End: 2024-05-17 | Stop reason: HOSPADM

## 2024-05-16 RX ORDER — ACETAMINOPHEN 500 MG
1000 TABLET ORAL EVERY 8 HOURS SCHEDULED
Status: DISCONTINUED | OUTPATIENT
Start: 2024-05-16 | End: 2024-05-17 | Stop reason: HOSPADM

## 2024-05-16 RX ORDER — OXYCODONE HYDROCHLORIDE 5 MG/1
5 TABLET ORAL EVERY 6 HOURS PRN
Status: DISCONTINUED | OUTPATIENT
Start: 2024-05-16 | End: 2024-05-17 | Stop reason: HOSPADM

## 2024-05-16 RX ORDER — DOCUSATE SODIUM 100 MG/1
100 CAPSULE, LIQUID FILLED ORAL 2 TIMES DAILY PRN
Status: DISCONTINUED | OUTPATIENT
Start: 2024-05-16 | End: 2024-05-17 | Stop reason: HOSPADM

## 2024-05-16 RX ORDER — MISOPROSTOL 200 UG/1
800 TABLET ORAL PRN
Status: DISCONTINUED | OUTPATIENT
Start: 2024-05-16 | End: 2024-05-17 | Stop reason: HOSPADM

## 2024-05-16 RX ORDER — ONDANSETRON 4 MG/1
8 TABLET, ORALLY DISINTEGRATING ORAL EVERY 8 HOURS PRN
Status: DISCONTINUED | OUTPATIENT
Start: 2024-05-16 | End: 2024-05-17 | Stop reason: HOSPADM

## 2024-05-16 RX ORDER — FERROUS SULFATE 325(65) MG
325 TABLET ORAL
Status: DISCONTINUED | OUTPATIENT
Start: 2024-05-16 | End: 2024-05-17 | Stop reason: HOSPADM

## 2024-05-16 RX ORDER — MISOPROSTOL 200 UG/1
TABLET ORAL
Status: DISCONTINUED
Start: 2024-05-16 | End: 2024-05-16 | Stop reason: WASHOUT

## 2024-05-16 RX ADMIN — ACETAMINOPHEN 1000 MG: 500 TABLET ORAL at 09:16

## 2024-05-16 RX ADMIN — IBUPROFEN 800 MG: 800 TABLET, FILM COATED ORAL at 02:47

## 2024-05-16 RX ADMIN — Medication 909.1 MILLI-UNITS/MIN: at 01:28

## 2024-05-16 RX ADMIN — DOCUSATE SODIUM 100 MG: 100 CAPSULE, LIQUID FILLED ORAL at 20:13

## 2024-05-16 RX ADMIN — Medication 166.7 ML: at 01:28

## 2024-05-16 RX ADMIN — ACETAMINOPHEN 1000 MG: 500 TABLET ORAL at 22:14

## 2024-05-16 RX ADMIN — IBUPROFEN 800 MG: 800 TABLET, FILM COATED ORAL at 15:52

## 2024-05-16 RX ADMIN — ACETAMINOPHEN 650 MG: 325 TABLET ORAL at 00:11

## 2024-05-16 RX ADMIN — Medication: at 02:48

## 2024-05-16 RX ADMIN — DOCUSATE SODIUM 100 MG: 100 CAPSULE, LIQUID FILLED ORAL at 09:16

## 2024-05-16 RX ADMIN — SODIUM CHLORIDE, POTASSIUM CHLORIDE, SODIUM LACTATE AND CALCIUM CHLORIDE: 600; 310; 30; 20 INJECTION, SOLUTION INTRAVENOUS at 00:17

## 2024-05-16 ASSESSMENT — PAIN DESCRIPTION - ORIENTATION
ORIENTATION: MID
ORIENTATION: LOWER

## 2024-05-16 ASSESSMENT — PAIN SCALES - GENERAL
PAINLEVEL_OUTOF10: 0
PAINLEVEL_OUTOF10: 2
PAINLEVEL_OUTOF10: 0
PAINLEVEL_OUTOF10: 4
PAINLEVEL_OUTOF10: 4

## 2024-05-16 ASSESSMENT — PAIN DESCRIPTION - LOCATION
LOCATION: PERINEUM

## 2024-05-16 ASSESSMENT — PAIN - FUNCTIONAL ASSESSMENT
PAIN_FUNCTIONAL_ASSESSMENT: ACTIVITIES ARE NOT PREVENTED

## 2024-05-16 ASSESSMENT — PAIN DESCRIPTION - DESCRIPTORS
DESCRIPTORS: SORE
DESCRIPTORS: CRAMPING
DESCRIPTORS: ACHING;DISCOMFORT;BURNING

## 2024-05-16 NOTE — FLOWSHEET NOTE
PATIENT ADMITTED TO MOM BABY UNIT IN WHEELCHAIR WITH BABY IN ARMS.  FOB AND OTHER CHILD FOLLOWED WITH BELONGINGS.  MOTHER HAS RIGHT SIDED FACIAL DROOPING FROM [employer] [place], HISTORY OF MURMUR(ASYMPTOMATIC), GESTATIONAL HT.  SHE DID HAVE EPISODE OF BP DROPPING WHEN SHE RECEIVED EPIDURAL IN LABOR AND DELIVERY, FINE NOW.  ASSISTED PATIENT TO TRANSFER TO BED, TOLERATED WELL.  PATIENT THAN DECIDED SHE NEEDED TO VOID, DANGLED, DID WELL, STOOD AND AMBULATED TO BATHROOM.   PATIENT VOIDED WITH ONE SMALL CLOT, PERICARE PERFORMED PER PATIENT WITH MINIMAL DIRECTION.  ASSISTED BACK TO BED.  ASSESSMENT PERFORMED, FUNDUS FIRM, MIDLINE, U1.  ORIENTED TO UNIT, DUCKS IN A ROW, MOM AND BABY SAFETY AND CARE, BACK TO SLEEP, INSTRUCTED ON BULB SUCTION IF NEEDED.  MOTHER PLANS TO BREASTFEED  AND HAS A PUMP AT HOME,  TO GET IN AM.  MOM CHOOSES TO GIVE BOTTLE TONIGHT.

## 2024-05-16 NOTE — LACTATION NOTE
Provided and discussed breastfeeding booklet with pt.  Encouraged pt. To call out for assistance if needed.

## 2024-05-16 NOTE — FLOWSHEET NOTE
RN remained at bedside throughout pushing. EFM continuously assessed. Vaginal delivery of viable infant.

## 2024-05-16 NOTE — L&D DELIVERY NOTE
Department of Obstetrics and Gynecology  Spontaneous Vaginal Delivery Note      Pt Name: Tiff XIAO Current  MRN: [id number] Acct #: [id number]  YOB: 1995  Procedure Performed By: Wanda Hamilton MD, MD        Pre-operative Diagnosis:  Term pregnancy, Induced labor, Single fetus, and Pregnancy complicated by: gHTN    Post-operative Diagnosis: Same, delivered.    Procedure: spontaneous vaginal delivery    Surgeon:  Wanda Hamilton    Information for the patient's :  Current, Boy Tiff [[id number]]        Anesthesia:  epidural anesthesia    Estimated blood loss:  200 ml    Complications:  none    Condition:  infant stable to general nursery and mother stable    Delivery Summary:  The patient is a 28 y.o.  at 40w6d who was admitted for induction. She received the following interventions: AROM and IV pitocin induction.  The patient progressed well,did receive an epidural, became complete and started to push. She was placed in the dorsal lithotomy position and prepped. She delivered the baby which was then placed on the mother's abdomen. Cord was clamped and cut and infant handed off to the waiting nurse for evaluation. The placenta delivered intact, whole and that the umbilical cord had three vessels noted. The perineum and vagina were explored and  Second degree laceration was repaired with Vicryl 3.0.       Vaginal sweep was performed at the conclusion of delivery and all needles were taken off the field. Sponge counts correct      PMH:  Past Medical History:   Diagnosis Date    ASD (atrial septal defect)     Repaired in          Wanda Hamilton MD

## 2024-05-16 NOTE — FLOWSHEET NOTE
Patient actively pushing. RN remains in continuous attendance at the bedside. Assessment & evaluation of fetal heart rate and contraction pattern ongoing by RN via continuous EFM.

## 2024-05-16 NOTE — FLOWSHEET NOTE
05/15/24 2039   Provider Notification   Reason for Communication Status Update   Name of Team Member Notified Joy   Treatment Team Role Attending Provider   Method of Communication Face to face     Update on current SVE

## 2024-05-16 NOTE — ANESTHESIA POSTPROCEDURE EVALUATION
Department of Anesthesiology  Postprocedure Note    Patient: Tiff XIAO Current  MRN: [id number]  YOB: 1995  Date of evaluation: 5/16/2024    Procedure Summary       Date: 05/15/24 Room / Location:     Anesthesia Start: 1222 Anesthesia Stop: 05/16/24 0125    Procedure: Labor Analgesia Diagnosis:     Scheduled Providers:  Responsible Provider: Deon Waldrop MD    Anesthesia Type: epidural ASA Status: 2            Anesthesia Type: No value filed.    Go Phase I: Go Score: 10    Go Phase II: Go Score: 10    Anesthesia Post Evaluation    Patient location during evaluation: floor  Patient participation: complete - patient participated  Level of consciousness: awake and alert  Airway patency: patent  Nausea & Vomiting: no nausea and no vomiting  Cardiovascular status: hemodynamically stable  Respiratory status: acceptable, room air and spontaneous ventilation  Hydration status: stable  Pain management: adequate        No notable events documented.

## 2024-05-16 NOTE — FLOWSHEET NOTE
05/16/24 0043   Provider Notification   Reason for Communication Status Update   Name of Team Member Notified Joy   Treatment Team Role Attending Provider   Method of Communication Call   Response Other (Comment)  (Update in 30 minutes)   Notification Time 0043

## 2024-05-16 NOTE — FLOWSHEET NOTE
PATIENT ADMITTED TO MOM BABY IN WHEELCHAIR WITH BABY IN ARMS, FOB FOLLOWED WITH PERSONAL BELONGINGS.  INT  INTACT, FLUIDS WERE REMOVED IN L&D.  PATIENT HAD VOIDED IN L&D X1, DTV FOR SECOND TIME.  PATIENT STOOD AND TRANSFERRED TO BED.  INFANT TO NURSERY DUE TO PARENTAL EXHAUSTION. ASSESSMENT PERFORMED, ORIENTED TO UNIT, DUCKS IN A ROW, MOTHER BABY SAFETY, INFANT SLEEP.  MOTHER PLANS TO BREAST FEED, BUT OK TO GIVE BOTTLE  X1 FEED AND PACIFIER.

## 2024-05-16 NOTE — FLOWSHEET NOTE
Patient transferred to mother baby via wheelchair with baby in arms.  transferred all belongings to mother baby unit.

## 2024-05-16 NOTE — FLOWSHEET NOTE
Infant has roomed in with mother this shift  except for maternal exhaustion.  Benefits of rooming in discussed.

## 2024-05-17 VITALS
TEMPERATURE: 97.9 F | HEART RATE: 58 BPM | SYSTOLIC BLOOD PRESSURE: 120 MMHG | OXYGEN SATURATION: 98 % | HEIGHT: 71 IN | RESPIRATION RATE: 16 BRPM | BODY MASS INDEX: 41.02 KG/M2 | DIASTOLIC BLOOD PRESSURE: 66 MMHG | WEIGHT: 293 LBS

## 2024-05-17 LAB — HGB BLD-MCNC: 11.8 GM/DL (ref 12–16)

## 2024-05-17 PROCEDURE — 6370000000 HC RX 637 (ALT 250 FOR IP): Performed by: OBSTETRICS & GYNECOLOGY

## 2024-05-17 PROCEDURE — 36415 COLL VENOUS BLD VENIPUNCTURE: CPT

## 2024-05-17 PROCEDURE — 85018 HEMOGLOBIN: CPT

## 2024-05-17 RX ADMIN — DOCUSATE SODIUM 100 MG: 100 CAPSULE, LIQUID FILLED ORAL at 10:18

## 2024-05-17 RX ADMIN — ACETAMINOPHEN 1000 MG: 500 TABLET ORAL at 10:18

## 2024-05-17 RX ADMIN — IBUPROFEN 800 MG: 800 TABLET, FILM COATED ORAL at 01:47

## 2024-05-17 RX ADMIN — IBUPROFEN 800 MG: 800 TABLET, FILM COATED ORAL at 11:59

## 2024-05-17 ASSESSMENT — PAIN SCALES - GENERAL
PAINLEVEL_OUTOF10: 3

## 2024-05-17 ASSESSMENT — PAIN - FUNCTIONAL ASSESSMENT: PAIN_FUNCTIONAL_ASSESSMENT: ACTIVITIES ARE NOT PREVENTED

## 2024-05-17 ASSESSMENT — PAIN DESCRIPTION - LOCATION: LOCATION: ABDOMEN

## 2024-05-17 ASSESSMENT — PAIN DESCRIPTION - DESCRIPTORS: DESCRIPTORS: CRAMPING

## 2024-05-17 NOTE — PLAN OF CARE
Problem: Pain  Goal: Verbalizes/displays adequate comfort level or baseline comfort level  Recent Flowsheet Documentation  Taken 5/16/2024 2233 by Susannah Lagunas RN  Verbalizes/displays adequate comfort level or baseline comfort level:   Encourage patient to monitor pain and request assistance   Administer analgesics based on type and severity of pain and evaluate response     Problem: Safety - Adult  Goal: Free from fall injury  Recent Flowsheet Documentation  Taken 5/16/2024 2233 by Susannah Lagunas RN  Free From Fall Injury:   Instruct family/caregiver on patient safety   Based on caregiver fall risk screen, instruct family/caregiver to ask for assistance with transferring infant if caregiver noted to have fall risk factors     Problem: Discharge Planning  Goal: Discharge to home or other facility with appropriate resources  Recent Flowsheet Documentation  Taken 5/16/2024 2233 by Susannah Lagunas RN  Discharge to home or other facility with appropriate resources:   Identify barriers to discharge with patient and caregiver   Identify discharge learning needs (meds, wound care, etc)     Problem: Chronic Conditions and Co-morbidities  Goal: Patient's chronic conditions and co-morbidity symptoms are monitored and maintained or improved  Recent Flowsheet Documentation  Taken 5/16/2024 2233 by Susannah Lagunas RN  Care Plan - Patient's Chronic Conditions and Co-Morbidity Symptoms are Monitored and Maintained or Improved:   Monitor and assess patient's chronic conditions and comorbid symptoms for stability, deterioration, or improvement   Collaborate with multidisciplinary team to address chronic and comorbid conditions and prevent exacerbation or deterioration     Problem: Postpartum  Goal: Experiences normal postpartum course  Description:  Postpartum OB-Pregnancy care plan goal which identifies if the mother is experiencing a normal postpartum course  5/16/2024 2233 by Susannah Lagunas RN  Outcome: 
  Problem: Postpartum  Goal: Experiences normal postpartum course  Description:  Postpartum OB-Pregnancy care plan goal which identifies if the mother is experiencing a normal postpartum course  Outcome: Progressing  Flowsheets (Taken 2024)  Experiences Normal Postpartum Course:   Monitor maternal vital signs   Assess uterine involution     Problem: Postpartum  Goal: Appropriate maternal -  bonding  Description:  Postpartum OB-Pregnancy care plan goal which identifies if the mother and  are bonding appropriately  Outcome: Progressing  Note: Bonding with baby, participating in infant care.      Problem: Postpartum  Goal: Establishment of infant feeding pattern  Description:  Postpartum OB-Pregnancy care plan goal which identifies if the mother is establishing a feeding pattern with their   Outcome: Progressing  Flowsheets (Taken 2024)  Establishment of Infant Feeding Pattern:   Assess breast/bottle feeding   Refer to lactation as needed     Problem: Pain  Goal: Verbalizes/displays adequate comfort level or baseline comfort level  Outcome: Progressing  Flowsheets (Taken 2024)  Verbalizes/displays adequate comfort level or baseline comfort level:   Encourage patient to monitor pain and request assistance   Assess pain using appropriate pain scale   Administer analgesics based on type and severity of pain and evaluate response   Implement non-pharmacological measures as appropriate and evaluate response   Consider cultural and social influences on pain and pain management     Problem: Infection - Adult  Goal: Absence of infection at discharge  Outcome: Progressing  Flowsheets (Taken 2024)  Absence of infection at discharge:   Assess and monitor for signs and symptoms of infection   Monitor lab/diagnostic results   Instruct and encourage patient and family to use good hand hygiene technique     Problem: Safety - Adult  Goal: Free from fall injury  Outcome: 
  Problem: Vaginal Birth or  Section  Goal: Fetal and maternal status remain reassuring during the birth process  Description:  Birth OB-Pregnancy care plan goal which identifies if the fetal and maternal status remain reassuring during the birth process  5/15/2024 0750 by Montserrat Zendejas RN  Outcome: Progressing  Flowsheets (Taken 5/15/2024 0750)  Fetal and Maternal Status Remain Reassuring During the Birth Process:   Monitor vital signs   Monitor labor progression (Vaginal delivery)   Monitor fetal heart rate   Monitor uterine activity  5/15/2024 0510 by Kayleen Bryant RN  Outcome: Progressing  Flowsheets (Taken 5/15/2024 0510)  Fetal and Maternal Status Remain Reassuring During the Birth Process:   Monitor vital signs   Monitor uterine activity   Monitor fetal heart rate   Monitor labor progression (Vaginal delivery)     Problem: Pain  Goal: Verbalizes/displays adequate comfort level or baseline comfort level  5/15/2024 0750 by Montserrat Zendejas RN  Outcome: Progressing  Flowsheets (Taken 5/15/2024 0750)  Verbalizes/displays adequate comfort level or baseline comfort level:   Encourage patient to monitor pain and request assistance   Administer analgesics based on type and severity of pain and evaluate response   Consider cultural and social influences on pain and pain management   Assess pain using appropriate pain scale   Implement non-pharmacological measures as appropriate and evaluate response   Notify Licensed Independent Practitioner if interventions unsuccessful or patient reports new pain     Problem: Infection - Adult  Goal: Absence of infection during hospitalization  5/15/2024 0750 by Montserrat Zendejas RN  Outcome: Progressing  Flowsheets (Taken 5/15/2024 0750)  Absence of infection during hospitalization:   Monitor lab/diagnostic results   Assess and monitor for signs and symptoms of infection   Monitor all insertion sites i.e., indwelling lines, tubes and drains   
  Problem: Vaginal Birth or  Section  Goal: Fetal and maternal status remain reassuring during the birth process  Description:  Birth OB-Pregnancy care plan goal which identifies if the fetal and maternal status remain reassuring during the birth process  Outcome: Progressing  Flowsheets (Taken 5/15/2024 0510)  Fetal and Maternal Status Remain Reassuring During the Birth Process:   Monitor vital signs   Monitor uterine activity   Monitor fetal heart rate   Monitor labor progression (Vaginal delivery)     Problem: Pain  Goal: Verbalizes/displays adequate comfort level or baseline comfort level  Outcome: Progressing  Flowsheets (Taken 5/15/2024 0510)  Verbalizes/displays adequate comfort level or baseline comfort level:   Encourage patient to monitor pain and request assistance   Assess pain using appropriate pain scale   Administer analgesics based on type and severity of pain and evaluate response   Implement non-pharmacological measures as appropriate and evaluate response   Notify Licensed Independent Practitioner if interventions unsuccessful or patient reports new pain     Problem: Infection - Adult  Goal: Absence of infection during hospitalization  Outcome: Progressing  Flowsheets (Taken 5/15/2024 0510)  Absence of infection during hospitalization:   Assess and monitor for signs and symptoms of infection   Monitor lab/diagnostic results   Monitor all insertion sites i.e., indwelling lines, tubes and drains   Instruct and encourage patient and family to use good hand hygiene technique   Administer medications as ordered     Problem: Safety - Adult  Goal: Free from fall injury  Outcome: Progressing  Flowsheets (Taken 5/15/2024 0510)  Free From Fall Injury: Instruct family/caregiver on patient safety     Problem: Discharge Planning  Goal: Discharge to home or other facility with appropriate resources  Outcome: Progressing  Flowsheets (Taken 5/15/2024 0510)  Discharge to home or other facility with 
assistance   Administer analgesics based on type and severity of pain and evaluate response   Consider cultural and social influences on pain and pain management   Assess pain using appropriate pain scale   Implement non-pharmacological measures as appropriate and evaluate response   Notify Licensed Independent Practitioner if interventions unsuccessful or patient reports new pain     Problem: Infection - Adult  Goal: Absence of infection during hospitalization  5/15/2024 1915 by Cora Ross RN  Outcome: Progressing  Flowsheets (Taken 5/15/2024 0750 by Montserrat Zendejas, RN)  Absence of infection during hospitalization:   Monitor lab/diagnostic results   Assess and monitor for signs and symptoms of infection   Monitor all insertion sites i.e., indwelling lines, tubes and drains   Administer medications as ordered   Instruct and encourage patient and family to use good hand hygiene technique   Identify and instruct in appropriate isolation precautions for identified infection/condition  Note: Will monitor patient for signs and symptoms of infection.     Problem: Safety - Adult  Goal: Free from fall injury  5/15/2024 1915 by Cora Ross RN  Outcome: Progressing  Flowsheets (Taken 5/15/2024 0750 by Montserrat Zendejas, RN)  Free From Fall Injury:   Instruct family/caregiver on patient safety   Based on caregiver fall risk screen, instruct family/caregiver to ask for assistance with transferring infant if caregiver noted to have fall risk factors  Note: Fall safety reviewed with patient and significant other.     Problem: Discharge Planning  Goal: Discharge to home or other facility with appropriate resources  5/15/2024 1915 by Cora Ross RN  Outcome: Progressing  Flowsheets (Taken 5/15/2024 0750 by Montserrat Zendejas, RN)  Discharge to home or other facility with appropriate resources:   Identify barriers to discharge with patient and caregiver   Identify discharge learning needs (meds, wound care, 
5/16/2024 0506)  Discharge to home or other facility with appropriate resources:   Identify barriers to discharge with patient and caregiver   Arrange for needed discharge resources and transportation as appropriate   Identify discharge learning needs (meds, wound care, etc)   Arrange for interpreters to assist at discharge as needed     Problem: Chronic Conditions and Co-morbidities  Goal: Patient's chronic conditions and co-morbidity symptoms are monitored and maintained or improved  5/16/2024 1008 by Jenny Sanchez RN  Outcome: Progressing  5/16/2024 0506 by Saundra Dickerson RN  Outcome: Progressing  Flowsheets (Taken 5/16/2024 0506)  Care Plan - Patient's Chronic Conditions and Co-Morbidity Symptoms are Monitored and Maintained or Improved: Monitor and assess patient's chronic conditions and comorbid symptoms for stability, deterioration, or improvement   Care plan reviewed with patient and she contributes to goal setting and voices understanding of plan of care.    
RN)  Discharge to home or other facility with appropriate resources:   Identify barriers to discharge with patient and caregiver   Identify discharge learning needs (meds, wound care, etc)  5/16/2024 2233 by Susannah Lagunas RN  Outcome: Progressing  Flowsheets (Taken 5/16/2024 2233)  Discharge to home or other facility with appropriate resources:   Identify barriers to discharge with patient and caregiver   Identify discharge learning needs (meds, wound care, etc)     Problem: Chronic Conditions and Co-morbidities  Goal: Patient's chronic conditions and co-morbidity symptoms are monitored and maintained or improved  5/17/2024 1059 by Bar Rouse RN  Outcome: Completed  Flowsheets (Taken 5/16/2024 2233 by Susannah Lagunas RN)  Care Plan - Patient's Chronic Conditions and Co-Morbidity Symptoms are Monitored and Maintained or Improved:   Monitor and assess patient's chronic conditions and comorbid symptoms for stability, deterioration, or improvement   Collaborate with multidisciplinary team to address chronic and comorbid conditions and prevent exacerbation or deterioration  5/16/2024 2233 by Susannah Lagunas RN  Outcome: Progressing  Flowsheets (Taken 5/16/2024 2233)  Care Plan - Patient's Chronic Conditions and Co-Morbidity Symptoms are Monitored and Maintained or Improved:   Monitor and assess patient's chronic conditions and comorbid symptoms for stability, deterioration, or improvement   Collaborate with multidisciplinary team to address chronic and comorbid conditions and prevent exacerbation or deterioration   Care plan reviewed with patient and spouse.  Patient and spouse verbalize understanding of the plan of care and contribute to goal setting.

## 2024-05-17 NOTE — PROGRESS NOTES
Department of Obstetrics and Gynecology  Labor and Delivery  Attending Post Partum Progress Note    PPD #1    SUBJECTIVE: Feeling well. No complaints.    OBJECTIVE:     Vitals:  /64   Pulse 64   Temp 98 °F (36.7 °C) (Oral)   Resp 16   Ht 1.803 m (5' 11\")   Wt (!) 145.2 kg (320 lb)   LMP  (LMP Unknown)   SpO2 98%   Breastfeeding Unknown   BMI 44.63 kg/m²     Uterus:  normal size, well involuted, firm, non-tender    DATA:    Hemoglobin:   Lab Results   Component Value Date/Time    HGB 11.8 05/17/2024 06:48 AM       ASSESSMENT & PLAN:  Doing well.   Pt requesting home today if possible.  gHTN- BPs well controlled,no meds      Wanda Hamilton MD 5/17/2024

## 2024-05-17 NOTE — LACTATION NOTE
Pt. Stated she has no questions or concerns at this time.  Encouraged pt. To call out for assistance as needed.

## 2024-05-17 NOTE — DISCHARGE SUMMARY
Vaginal Delivery Discharge Summary    Gestational Age:40w6d    Antepartum complications: gHTN    Admission date: 5/15/2024  5:04 AM      Type of Delivery:      [place] Data  Information for the patient's :  Current, Boy Tiff [[id number]]   male   Birth Weight: 3.93 kg (8 lb 10.6 oz)     Labs: CBC   Lab Results   Component Value Date    HGB 11.8 (L) 2024    HCT 40.1 05/15/2024        Intrapartum complications: None    Postpartum complications: none    The patient is ambulating well. The patient is tolerating a normal diet.      Patient Instructions:   Activity: activity as tolerated and no sex for 6 weeks  Diet: regular  Wound Care: as directed    Discharge Information  Current Discharge Medication List        CONTINUE these medications which have NOT CHANGED    Details   famotidine (PEPCID) 20 MG tablet Take 1 tablet by mouth 2 times daily      Prenatal Vit-Fe Fumarate-FA (PRENATAL VITAMIN PO) Take by mouth daily             No discharge procedures on file.    Condition: Good    Plan:   Follow up in 5 week(s)    Electronically signed by Wnada Hamilton MD on 2024 at 8:29 AM

## 2024-05-17 NOTE — PROGRESS NOTES
Post birth warning signs education paper given and reviewed, teaching complete. [place] postpartum depression screening discussed with patient, instructed to contact her healthcare provider if her score is > 10. Patient voiced understanding.  Mother's blood type is A+.  Mother did not receive Rhogam.      ID bands checked. Discharge teaching complete, discharge instructions signed, & parent/guardian denies questions regarding infant care at time of discharge.  Parents  verbalized understanding to follow-up with the pediatrician or family physician as  recommended on the discharge instructions.  Mother verbalizes understanding to follow-up with baby’s care provider as instructed.  Discharged in stable condition to care of parents.

## 2024-05-18 ENCOUNTER — HOSPITAL ENCOUNTER (EMERGENCY)
Age: 29
Discharge: HOME | End: 2024-05-18
Attending: EMERGENCY MEDICINE
Payer: COMMERCIAL

## 2024-05-18 ENCOUNTER — APPOINTMENT (OUTPATIENT)
Dept: GENERAL RADIOLOGY | Age: 29
End: 2024-05-18
Payer: COMMERCIAL

## 2024-05-18 VITALS
SYSTOLIC BLOOD PRESSURE: 138 MMHG | HEART RATE: 59 BPM | TEMPERATURE: 98.4 F | RESPIRATION RATE: 15 BRPM | OXYGEN SATURATION: 100 % | BODY MASS INDEX: 43.52 KG/M2 | WEIGHT: 293 LBS | DIASTOLIC BLOOD PRESSURE: 65 MMHG

## 2024-05-18 DIAGNOSIS — R42 DIZZINESS: Primary | ICD-10-CM

## 2024-05-18 LAB
ALBUMIN SERPL BCG-MCNC: 3.1 G/DL (ref 3.5–5.1)
ALP SERPL-CCNC: 116 U/L (ref 38–126)
ALT SERPL W/O P-5'-P-CCNC: 32 U/L (ref 11–66)
ANION GAP SERPL CALC-SCNC: 11 MEQ/L (ref 8–16)
AST SERPL-CCNC: 45 U/L (ref 5–40)
BACTERIA URNS QL MICRO: ABNORMAL /HPF
BASOPHILS ABSOLUTE: 0 THOU/MM3 (ref 0–0.1)
BASOPHILS NFR BLD AUTO: 0.3 %
BILIRUB CONJ SERPL-MCNC: < 0.2 MG/DL (ref 0–0.3)
BILIRUB SERPL-MCNC: 0.3 MG/DL (ref 0.3–1.2)
BILIRUB UR QL STRIP.AUTO: NEGATIVE
BUN SERPL-MCNC: 12 MG/DL (ref 7–22)
CALCIUM SERPL-MCNC: 9 MG/DL (ref 8.5–10.5)
CASTS #/AREA URNS LPF: ABNORMAL /LPF
CASTS 2: ABNORMAL /LPF
CHARACTER UR: CLEAR
CHLORIDE SERPL-SCNC: 104 MEQ/L (ref 98–111)
CO2 SERPL-SCNC: 25 MEQ/L (ref 23–33)
COLOR: YELLOW
CREAT SERPL-MCNC: 0.7 MG/DL (ref 0.4–1.2)
CREAT UR-MCNC: 28.9 MG/DL
CRYSTALS URNS MICRO: ABNORMAL
DEPRECATED RDW RBC AUTO: 43.5 FL (ref 35–45)
EKG ATRIAL RATE: 68 BPM
EKG P AXIS: 15 DEGREES
EKG P-R INTERVAL: 172 MS
EKG Q-T INTERVAL: 388 MS
EKG QRS DURATION: 88 MS
EKG QTC CALCULATION (BAZETT): 412 MS
EKG R AXIS: 59 DEGREES
EKG T AXIS: 65 DEGREES
EKG VENTRICULAR RATE: 68 BPM
EOSINOPHIL NFR BLD AUTO: 2.4 %
EOSINOPHILS ABSOLUTE: 0.2 THOU/MM3 (ref 0–0.4)
EPITHELIAL CELLS, UA: ABNORMAL /HPF
ERYTHROCYTE [DISTWIDTH] IN BLOOD BY AUTOMATED COUNT: 14.3 % (ref 11.5–14.5)
GFR SERPL CREATININE-BSD FRML MDRD: > 90 ML/MIN/1.73M2
GLUCOSE SERPL-MCNC: 83 MG/DL (ref 70–108)
GLUCOSE UR QL STRIP.AUTO: NEGATIVE MG/DL
HCT VFR BLD AUTO: 34.9 % (ref 37–47)
HGB BLD-MCNC: 11.6 GM/DL (ref 12–16)
HGB UR QL STRIP.AUTO: ABNORMAL
IMM GRANULOCYTES # BLD AUTO: 0.14 THOU/MM3 (ref 0–0.07)
IMM GRANULOCYTES NFR BLD AUTO: 1.4 %
KETONES UR QL STRIP.AUTO: NEGATIVE
LYMPHOCYTES ABSOLUTE: 1.9 THOU/MM3 (ref 1–4.8)
LYMPHOCYTES NFR BLD AUTO: 18.6 %
MCH RBC QN AUTO: 27.9 PG (ref 26–33)
MCHC RBC AUTO-ENTMCNC: 33.2 GM/DL (ref 32.2–35.5)
MCV RBC AUTO: 83.9 FL (ref 81–99)
MISCELLANEOUS 2: ABNORMAL
MONOCYTES ABSOLUTE: 0.6 THOU/MM3 (ref 0.4–1.3)
MONOCYTES NFR BLD AUTO: 5.4 %
NEUTROPHILS ABSOLUTE: 7.3 THOU/MM3 (ref 1.8–7.7)
NEUTROPHILS NFR BLD AUTO: 71.9 %
NITRITE UR QL STRIP: NEGATIVE
NRBC BLD AUTO-RTO: 0 /100 WBC
NT-PROBNP SERPL IA-MCNC: 580.6 PG/ML (ref 0–124)
OSMOLALITY SERPL CALC.SUM OF ELEC: 278.3 MOSMOL/KG (ref 275–300)
PH UR STRIP.AUTO: 6.5 [PH] (ref 5–9)
PLATELET # BLD AUTO: 216 THOU/MM3 (ref 130–400)
PMV BLD AUTO: 11.2 FL (ref 9.4–12.4)
POTASSIUM SERPL-SCNC: 4.2 MEQ/L (ref 3.5–5.2)
PROT SERPL-MCNC: 6 G/DL (ref 6.1–8)
PROT UR STRIP.AUTO-MCNC: NEGATIVE MG/DL
PROT UR-MCNC: 5.5 MG/DL
PROT/CREAT 24H UR: 0.19 MG/G{CREAT}
RBC # BLD AUTO: 4.16 MILL/MM3 (ref 4.2–5.4)
RBC URINE: ABNORMAL /HPF
RENAL EPI CELLS #/AREA URNS HPF: ABNORMAL /[HPF]
SODIUM SERPL-SCNC: 140 MEQ/L (ref 135–145)
SP GR UR REFRACT.AUTO: 1 (ref 1–1.03)
UROBILINOGEN, URINE: 0.2 EU/DL (ref 0–1)
WBC # BLD AUTO: 10.2 THOU/MM3 (ref 4.8–10.8)
WBC #/AREA URNS HPF: ABNORMAL /HPF
WBC #/AREA URNS HPF: ABNORMAL /[HPF]
YEAST LIKE FUNGI URNS QL MICRO: ABNORMAL

## 2024-05-18 PROCEDURE — 84156 ASSAY OF PROTEIN URINE: CPT

## 2024-05-18 PROCEDURE — 82570 ASSAY OF URINE CREATININE: CPT

## 2024-05-18 PROCEDURE — 99285 EMERGENCY DEPT VISIT HI MDM: CPT

## 2024-05-18 PROCEDURE — 81001 URINALYSIS AUTO W/SCOPE: CPT

## 2024-05-18 PROCEDURE — 85025 COMPLETE CBC W/AUTO DIFF WBC: CPT

## 2024-05-18 PROCEDURE — 93010 ELECTROCARDIOGRAM REPORT: CPT | Performed by: INTERNAL MEDICINE

## 2024-05-18 PROCEDURE — 71045 X-RAY EXAM CHEST 1 VIEW: CPT

## 2024-05-18 PROCEDURE — 80048 BASIC METABOLIC PNL TOTAL CA: CPT

## 2024-05-18 PROCEDURE — 83880 ASSAY OF NATRIURETIC PEPTIDE: CPT

## 2024-05-18 PROCEDURE — 93005 ELECTROCARDIOGRAM TRACING: CPT

## 2024-05-18 PROCEDURE — 36415 COLL VENOUS BLD VENIPUNCTURE: CPT

## 2024-05-18 PROCEDURE — 80076 HEPATIC FUNCTION PANEL: CPT

## 2024-05-18 PROCEDURE — 87086 URINE CULTURE/COLONY COUNT: CPT

## 2024-05-18 RX ORDER — LABETALOL HYDROCHLORIDE 5 MG/ML
10 INJECTION INTRAVENOUS ONCE
Status: DISCONTINUED | OUTPATIENT
Start: 2024-05-18 | End: 2024-05-18 | Stop reason: HOSPADM

## 2024-05-18 RX ORDER — IBUPROFEN 800 MG/1
800 TABLET ORAL EVERY 6 HOURS PRN
COMMUNITY

## 2024-05-18 RX ORDER — NITROFURANTOIN 25; 75 MG/1; MG/1
100 CAPSULE ORAL 2 TIMES DAILY
Qty: 10 CAPSULE | Refills: 0 | Status: SHIPPED | OUTPATIENT
Start: 2024-05-18 | End: 2024-05-23

## 2024-05-18 ASSESSMENT — PAIN - FUNCTIONAL ASSESSMENT
PAIN_FUNCTIONAL_ASSESSMENT: NONE - DENIES PAIN
PAIN_FUNCTIONAL_ASSESSMENT: NONE - DENIES PAIN

## 2024-05-18 NOTE — ED NOTES
Patient sitting in cot, on cell phone. Patient denies dizziness at this time. Patient denies any pain at this time.    [Hypertension] : hypertension [Other: ____] : [unfilled] [FreeTextEntry1] : \par \par Patient had increasing palpitations and had Holter monitor showing PVCs rare APCs no complex forms.  Symptoms are little better.  He thinks some of this may be related to nasal congestion which is under ENT evaluation.  Feels palpitation and base of the neck.  No syncope.  No chest pain shortness of breath.

## 2024-05-18 NOTE — ED PROVIDER NOTES
[facility] EMERGENCY DEPT  EMERGENCY DEPARTMENT ENCOUNTER          Pt Name: Tiff Castaneda  MRN: [id number]  Birthdate 1995  Date of evaluation: 2024  Physician: Aidan Hayward MD  Supervising Attending Physician: [place] att. providers found       CHIEF COMPLAINT       Chief Complaint   Patient presents with    Dizziness     Delivered full term baby on Wednesday-sent by OB         HISTORY OF PRESENT ILLNESS    HPI  Tiff Castaneda is a 28 y.o. female  who presents to the emergency department {Blank multiple:::\"from home\",\"from nursing home\",\"from assisted living facility\",\"as a transfer from\",\"***\"}, {Blank multiple:::\"by private vehicle\",\"brought in by EMS\",\"as a walk in to the ED lobby\",\"***\"} for evaluation of ***  The patient has no other acute complaints at this time.      REVIEW OF SYSTEMS   Review of Systems      PAST MEDICAL AND SURGICAL HISTORY     Past Medical History:   Diagnosis Date    ASD (atrial septal defect)     Repaired in      Past Surgical History:   Procedure Laterality Date    CARDIAC SURGERY      ASD repair at [facility]    TYMPANOSTOMY TUBE PLACEMENT      age 6-7    WISDOM TOOTH EXTRACTION           MEDICATIONS   No current facility-administered medications for this encounter.    Current Outpatient Medications:     ibuprofen (ADVIL;MOTRIN) 800 MG tablet, Take 1 tablet by mouth every 6 hours as needed for Pain, Disp: , Rfl:     Acetaminophen (TYLENOL PO), Take by mouth, Disp: , Rfl:     famotidine (PEPCID) 20 MG tablet, Take 1 tablet by mouth 2 times daily, Disp: , Rfl:     Prenatal Vit-Fe Fumarate-FA (PRENATAL VITAMIN PO), Take by mouth daily, Disp: , Rfl:     Previous Medications    ACETAMINOPHEN (TYLENOL PO)    Take by mouth    FAMOTIDINE (PEPCID) 20 MG TABLET    Take 1 tablet by mouth 2 times daily    IBUPROFEN (ADVIL;MOTRIN) 800 MG TABLET    Take 1 tablet by mouth every 6 hours as needed for Pain    PRENATAL VIT-FE

## 2024-05-18 NOTE — ED NOTES
Patient presents to the ED for concerns of dizziness that started around 1230 this afternoon. Patient recently gave birth, vaginally, to a full term baby this past Wednesday. She is P-1. G-1 with no complications. Patient states she was referred here by the on call OB due to face being flushed and feeling dizzy. Patient's OB is Dr. Coffman. Significant other to bedside.

## 2024-05-18 NOTE — DISCHARGE INSTRUCTIONS
Ordered macrobid for UTI/pyuria, take one tablet twice daily for 5 days.  Follow up with OBGYN specialist as scheduled  Establish care with family medicine clinic    Strict return to ED precautions given if symptoms return or worsen

## 2024-05-18 NOTE — ED PROVIDER NOTES
PATIENT NAME: Tiff Castaneda  MRN: [id number]  : 1995  PORTILLO: 2024    I performed a history and physical examination of the patient and discussed management with the Resident. I reviewed the Resident's note and agree with the documented findings and plan of care. Any areas of disagreement are noted on the chart. I was personally present for the key portions of any procedures and have documented in the chart those procedures where I was not present during the key portions. I have reviewed the emergency nurses triage note and agree with the chief complaint, past medical history, past surgical history, allergies, medications, social and family history as documented unless otherwise noted below.    MEDICAL DEDISION MAKING (MDM)     Tiff Castaneda is a 28 y.o. female who presents to Emergency Department with Dizziness (Delivered full term baby on Wednesday-sent by OB)     Post partum day 2. Dizziness and hazy feeling this AM, worse when changing positions. She had two ibuprofen before arrival. Also having mild B/L swelling today.  Her pregnancy is complicated by mild gestational HTN.  OB is Dr. Wanda Boone.    Physical exam: AVSS.  Heart and lungs unremarkable.  Soft abdomen without tenderness.  Neurologically intact.    ED workups are reassuring, no evidence patient has postpartum pregnancy or HELPP syndrome, no evidence of postpartum cardiomyopathy.  BP improves to normal spontaneously without ED intervention.  OB on-call (Dr. Mckoy) is discussed ED workups.  Discharged with OB follow-up within 1 week      Vitals:    24 1726 24 1758 24 1840 24 1846   BP: 134/79 131/70 138/65    Pulse:  54 51 59   Resp:  13 16 15   Temp:       TempSrc:       SpO2:       Weight:         Labs Reviewed   CBC WITH AUTO DIFFERENTIAL - Abnormal; Notable for the following components:       Result Value    RBC 4.16 (*)     Hemoglobin 11.6 (*)     Hematocrit 34.9 (*)     Immature Grans (Abs) 0.14 (*)

## 2024-05-19 LAB
BACTERIA UR CULT: ABNORMAL
ORGANISM: ABNORMAL

## 2024-05-20 ENCOUNTER — CARE COORDINATION (OUTPATIENT)
Dept: OTHER | Facility: CLINIC | Age: 29
End: 2024-05-20

## 2024-05-20 NOTE — CARE COORDINATION
uterine contractions, clear drainage from vagina, bleeding).  Provide information about follow-up care when client is discharged.  Advise client to empty bladder every two (2) hours while awake  Review daily fluid need; avoid coffee.  Encourage regular rest periods 2-3 times a day in side-lying position. If bedrest is to be continued after discharge, suggest client spend part of day on couch or recliner. Assist patient to identify any risky behaviors and support change;     Red flags maternity  Call 911 anytime you think you may need emergency care. For example, call if:  Call your doctor now or seek immediate medical care if:     You passed out (lost consciousness).  You have a seizure.  You have severe vaginal bleeding.  You have severe pain in your belly or pelvis.  You have had fluid gushing or leaking from your vagina and you know or think the  umbilical cord is bulging into your vagina. If this happens, immediately get down on  your knees so your rear end (buttocks) is higher than your head. This will decrease  the pressure on the cord until help arrives.     You have signs of preeclampsia, such as:  Sudden swelling of your face, hands, or feet.  New vision problems (such as dimness, blurring, or seeing spots).  A severe headache.  You have any vaginal bleeding.  You have belly pain or cramping.            Provided Education: related to jimenez-care, safe sleep for mom/baby, monitoring for s/s of infection and/or mastitis, s/s of PP depression, importance of taking all medications as prescribed and importance of PP follow-up,     Plan: Continue monthly outreaches to provide telephonic support, education and resources as needed.   Discuss / follow up on: Goal progress and follow-up appointment scheduling    Summary Note: S/w patient today for PP follow-up call. This is patient's first baby  Patient did participate in BWWB and did participate in Maternity Education.   Partner at home helping during PP period.

## 2024-05-23 ASSESSMENT — ENCOUNTER SYMPTOMS
NAUSEA: 0
WHEEZING: 0
DIARRHEA: 0
SHORTNESS OF BREATH: 0
VOMITING: 0
ABDOMINAL PAIN: 0
CHEST TIGHTNESS: 0
CONSTIPATION: 0

## 2024-05-28 ENCOUNTER — OFFICE VISIT (OUTPATIENT)
Dept: FAMILY MEDICINE CLINIC | Age: 29
End: 2024-05-28
Payer: COMMERCIAL

## 2024-05-28 VITALS
HEART RATE: 60 BPM | HEIGHT: 71 IN | SYSTOLIC BLOOD PRESSURE: 132 MMHG | OXYGEN SATURATION: 98 % | TEMPERATURE: 98.5 F | WEIGHT: 293 LBS | RESPIRATION RATE: 21 BRPM | BODY MASS INDEX: 41.02 KG/M2 | DIASTOLIC BLOOD PRESSURE: 70 MMHG

## 2024-05-28 DIAGNOSIS — R74.01 ELEVATED AST (SGOT): ICD-10-CM

## 2024-05-28 DIAGNOSIS — Z76.89 ENCOUNTER TO ESTABLISH CARE: Primary | ICD-10-CM

## 2024-05-28 DIAGNOSIS — O99.019 ANEMIA AFFECTING FIRST PREGNANCY: ICD-10-CM

## 2024-05-28 PROCEDURE — 99203 OFFICE O/P NEW LOW 30 MIN: CPT

## 2024-05-28 SDOH — ECONOMIC STABILITY: INCOME INSECURITY: HOW HARD IS IT FOR YOU TO PAY FOR THE VERY BASICS LIKE FOOD, HOUSING, MEDICAL CARE, AND HEATING?: NOT HARD AT ALL

## 2024-05-28 SDOH — ECONOMIC STABILITY: HOUSING INSECURITY
IN THE LAST 12 MONTHS, WAS THERE A TIME WHEN YOU DID NOT HAVE A STEADY PLACE TO SLEEP OR SLEPT IN A SHELTER (INCLUDING NOW)?: NO

## 2024-05-28 SDOH — ECONOMIC STABILITY: FOOD INSECURITY: WITHIN THE PAST 12 MONTHS, THE FOOD YOU BOUGHT JUST DIDN'T LAST AND YOU DIDN'T HAVE MONEY TO GET MORE.: NEVER TRUE

## 2024-05-28 SDOH — ECONOMIC STABILITY: FOOD INSECURITY: WITHIN THE PAST 12 MONTHS, YOU WORRIED THAT YOUR FOOD WOULD RUN OUT BEFORE YOU GOT MONEY TO BUY MORE.: NEVER TRUE

## 2024-05-28 ASSESSMENT — PATIENT HEALTH QUESTIONNAIRE - PHQ9
SUM OF ALL RESPONSES TO PHQ QUESTIONS 1-9: 0
2. FEELING DOWN, DEPRESSED OR HOPELESS: NOT AT ALL
SUM OF ALL RESPONSES TO PHQ9 QUESTIONS 1 & 2: 0
SUM OF ALL RESPONSES TO PHQ QUESTIONS 1-9: 0
1. LITTLE INTEREST OR PLEASURE IN DOING THINGS: NOT AT ALL

## 2024-05-28 ASSESSMENT — ENCOUNTER SYMPTOMS
DIARRHEA: 0
RHINORRHEA: 0
COUGH: 0
ABDOMINAL PAIN: 0
SHORTNESS OF BREATH: 0
BLOOD IN STOOL: 0
NAUSEA: 0
VOMITING: 0

## 2024-05-28 NOTE — PROGRESS NOTES
[facility] PROFESSIONAL [facility] MEDICINE PRACTICE  [address]  [facility] [address]  Dept: [phone]  Dept Fax: [phone]  Loc: [phone]    HPI:     Tiff XIAO Current is a 28 y.o. female who presents today for:  Chief Complaint   Patient presents with    Follow-up     Patient presents for ED follow up - felt hazy for a day. Took BP at home and it was 152/90. Pt recently delivered 5/16/24 - vaginal delivery, was induced at 40w6d.     In ED urine was positive for mod leukocyte esterase and few bacteria, finished Macrobid. Had some BLE swelling at the end of pregnancy, states it has gone down a lot. No history of gallstones. OB/Gyn is Dr. Hamilton.     Taking prenatal along with flintstone with iron supplement.    Health Maintenance Topics with due status: Overdue       Topic Date Due    Varicella vaccine Never done    Depression Screen Never done    HIV screen Never done    Hepatitis C screen Never done    COVID-19 Vaccine 09/01/2023     Review of Systems   Constitutional:  Negative for chills and fever.   HENT:  Negative for rhinorrhea and sneezing.    Respiratory:  Negative for cough and shortness of breath.    Cardiovascular:  Negative for chest pain and palpitations.   Gastrointestinal:  Negative for abdominal pain, blood in stool, diarrhea, nausea and vomiting.   Genitourinary:  Negative for hematuria.   Skin:  Negative for rash.   Neurological:  Negative for dizziness, light-headedness and headaches.   All other systems reviewed and are negative.      Past Medical History:          Diagnosis Date    ASD (atrial septal defect)     Repaired in 2000       Past Surgical History:          Procedure Laterality Date    CARDIAC SURGERY  2000    ASD repair at [facility]    TYMPANOSTOMY TUBE PLACEMENT      age 6-7    WISDOM TOOTH EXTRACTION  2012       Medications:      has a current medication list which includes the following prescription(s):

## 2024-05-28 NOTE — PROGRESS NOTES
Health Maintenance Due   Topic Date Due    Varicella vaccine (1 of 2 - 2-dose childhood series) Never done    Depression Screen  Never done    HIV screen  Never done    Hepatitis C screen  Never done    COVID-19 Vaccine (3 - 2023-24 season) 09/01/2023

## 2024-06-17 ENCOUNTER — CARE COORDINATION (OUTPATIENT)
Dept: OTHER | Facility: CLINIC | Age: 29
End: 2024-06-17

## 2024-06-17 NOTE — CARE COORDINATION
Ambulatory Care Coordination Note    ACM attempted to reach patient for introduction to Associate Care Management related to PP follow-up. HIPAA compliant message was not left, the number is disconnected.     Plan for follow-up call in 7-10 days      Future Appointments   Date Time Provider Department Center   6/27/2024  2:40 PM Vern Collins DO [facility]  RES [facility] - MARILYN Noguera, RN  Associate Care Manager   Cell: [phone]  [email or web address]

## 2024-06-26 ENCOUNTER — NURSE ONLY (OUTPATIENT)
Dept: LAB | Age: 29
End: 2024-06-26

## 2024-06-26 DIAGNOSIS — R74.01 ELEVATED AST (SGOT): ICD-10-CM

## 2024-06-26 DIAGNOSIS — O99.019 ANEMIA AFFECTING FIRST PREGNANCY: ICD-10-CM

## 2024-06-26 DIAGNOSIS — Z76.89 ENCOUNTER TO ESTABLISH CARE: ICD-10-CM

## 2024-06-26 LAB
ALBUMIN SERPL BCG-MCNC: 4.2 G/DL (ref 3.5–5.1)
ALP SERPL-CCNC: 67 U/L (ref 38–126)
ALT SERPL W/O P-5'-P-CCNC: 13 U/L (ref 11–66)
AST SERPL-CCNC: 18 U/L (ref 5–40)
BILIRUB CONJ SERPL-MCNC: 0.2 MG/DL (ref 0.1–13.8)
BILIRUB SERPL-MCNC: 0.6 MG/DL (ref 0.3–1.2)
DEPRECATED MEAN GLUCOSE BLD GHB EST-ACNC: 102 MG/DL (ref 70–126)
HBA1C MFR BLD HPLC: 5.4 % (ref 4.4–6.4)
HCT VFR BLD AUTO: 42.9 % (ref 37–47)
HGB BLD-MCNC: 14 GM/DL (ref 12–16)
PROT SERPL-MCNC: 7.1 G/DL (ref 6.1–8)
T4 FREE SERPL-MCNC: 1.06 NG/DL (ref 0.93–1.68)
TSH SERPL DL<=0.005 MIU/L-ACNC: 2.4 UIU/ML (ref 0.4–4.2)

## 2024-06-27 ENCOUNTER — OFFICE VISIT (OUTPATIENT)
Dept: FAMILY MEDICINE CLINIC | Age: 29
End: 2024-06-27
Payer: COMMERCIAL

## 2024-06-27 VITALS
SYSTOLIC BLOOD PRESSURE: 124 MMHG | OXYGEN SATURATION: 97 % | RESPIRATION RATE: 16 BRPM | HEART RATE: 88 BPM | BODY MASS INDEX: 41.02 KG/M2 | TEMPERATURE: 98.2 F | HEIGHT: 71 IN | DIASTOLIC BLOOD PRESSURE: 72 MMHG | WEIGHT: 293 LBS

## 2024-06-27 DIAGNOSIS — Z71.82 EXERCISE COUNSELING: ICD-10-CM

## 2024-06-27 DIAGNOSIS — E66.01 CLASS 3 SEVERE OBESITY DUE TO EXCESS CALORIES WITHOUT SERIOUS COMORBIDITY WITH BODY MASS INDEX (BMI) OF 40.0 TO 44.9 IN ADULT (HCC): Primary | ICD-10-CM

## 2024-06-27 DIAGNOSIS — Z71.3 DIETARY COUNSELING AND SURVEILLANCE: ICD-10-CM

## 2024-06-27 DIAGNOSIS — Z86.79: ICD-10-CM

## 2024-06-27 PROCEDURE — 99212 OFFICE O/P EST SF 10 MIN: CPT

## 2024-06-27 RX ORDER — M-VIT,TX,IRON,MINS/CALC/FOLIC 27MG-0.4MG
1 TABLET ORAL DAILY
COMMUNITY

## 2024-06-27 NOTE — PROGRESS NOTES
Attending Supervising Physician’s Attestation Statement  The patient met the criteria for indirect supervision.  I discussed the findings and plans with the resident physician and agree as documented in her note .     Diagnosis Orders   1. Class 3 severe obesity due to excess calories without serious comorbidity with body mass index (BMI) of 40.0 to 44.9 in adult (HCC)          Diet and physical activity    Electronically signed by Janes Rivera MD on 6/27/24 at 3:37 PM EDT      
Health Maintenance Due   Topic Date Due    Varicella vaccine (1 of 2 - 2-dose childhood series) Never done    HIV screen  Never done    Hepatitis C screen  Never done    COVID-19 Vaccine (3 - 2023-24 season) 09/01/2023       
sounds: No murmur heard.     No friction rub. No gallop.   Pulmonary:      Effort: Pulmonary effort is normal. No respiratory distress.      Breath sounds: Normal breath sounds. No wheezing or rales.   Abdominal:      Palpations: Abdomen is soft.      Tenderness: There is no abdominal tenderness.   Musculoskeletal:      Cervical back: Normal range of motion and neck supple.   Skin:     General: Skin is warm and dry.   Neurological:      Mental Status: She is alert and oriented to person, place, and time.   Psychiatric:         Mood and Affect: Mood normal.         Behavior: Behavior normal.           Assessment/Plan:   1. Class 3 severe obesity due to excess calories without serious comorbidity with body mass index (BMI) of 40.0 to 44.9 in adult (HCC)  2. Dietary counseling and surveillance  3. Exercise counseling  4. Resolved mild anemia    - Pt presents for f/u labs/ postpartum anemia. Anemia resolved - Hgb 14.0.  - Provided counseling on diet and exercise for class 3 severe obesity  - F/u in 6 months for wellness/ yearly physical            Return in about 6 months (around 12/27/2024) for wellness physical.     Future Appointments   Date Time Provider Department Center   1/9/2025  1:40 PM Vern Collins DO [facility] [place]       Electronically signed by Vern Collins DO on 6/28/2024 at 3:45 PM

## 2024-06-27 NOTE — PATIENT INSTRUCTIONS
Thank you   Thank you for trusting us with your healthcare needs. You may receive a survey regarding today's visit. It would help us out if you would take a few moments to provide your feedback. We value your input.  Please bring in ALL medications BOTTLES, including inhalers, herbal supplements, over the counter, prescribed & non-prescribed medicine. The office would like actual medication bottles and a list.         4.  Prior to getting your labs drawn, check with your insurance company for benefits and eligibility of lab services.  Often, insurance companies cover certain tests for preventative visits only.  It is patient's responsibility to    see what is covered.    5.  If the list below has been completed, PLEASE FAX RECORDS TO OUR OFFICE @ [phone]. Once the records have been received we will update your records at our office:  Health Maintenance Due   Topic Date Due    Varicella vaccine (1 of 2 - 2-dose childhood series) Never done    HIV screen  Never done    Hepatitis C screen  Never done    COVID-19 Vaccine (3 - 2023-24 season) 09/01/2023

## 2024-06-28 ASSESSMENT — ENCOUNTER SYMPTOMS
VOMITING: 0
BLOOD IN STOOL: 0
COUGH: 0
ABDOMINAL PAIN: 0
RHINORRHEA: 0
NAUSEA: 0
DIARRHEA: 0
SHORTNESS OF BREATH: 0

## 2024-07-08 ENCOUNTER — TELEPHONE (OUTPATIENT)
Dept: FAMILY MEDICINE CLINIC | Age: 29
End: 2024-07-08

## 2024-07-30 ENCOUNTER — CARE COORDINATION (OUTPATIENT)
Dept: OTHER | Facility: CLINIC | Age: 29
End: 2024-07-30

## 2024-07-30 NOTE — CARE COORDINATION
Ambulatory Care Coordination Note    ACM attempted 2nd outreach to patient for  Associate Care Management related to PP follow-up. HIPAA compliant message left requesting a return phone call at patient convenience.     Unable to Reach Letter sent to patient via [employer].    Will continue to outreach.      Future Appointments   Date Time Provider Department Center   1/9/2025  1:40 PM Vern Collins DO [facility] [place]         MARILYN Poon, RN  Associate Care Manager   Cell: [phone]  [email or web address]

## 2024-08-22 ENCOUNTER — CARE COORDINATION (OUTPATIENT)
Dept: OTHER | Facility: CLINIC | Age: 29
End: 2024-08-22

## 2024-08-22 NOTE — CARE COORDINATION
Ambulatory Care Coordination Note    ACM attempted third and final call to patient for Associate Care Management. HIPAA compliant message left requesting a return phone call at patient convenience.    Final Unable to Reach Letter sent to patient via [employer].    No further outreach scheduled with this ACM, patient has been provided with this ACM's contact information.  ACM will sign off care team at this time.     Future Appointments   Date Time Provider Department Center   1/9/2025  1:40 PM Vern Collins,  SRPX  RES [facility] ECC DEP       MARILYN Poon, RN  Associate Care Manager   Cell: [phone]  [email or web address]

## 2024-08-29 ENCOUNTER — CARE COORDINATION (OUTPATIENT)
Dept: OTHER | Facility: CLINIC | Age: 29
End: 2024-08-29

## 2024-12-24 ENCOUNTER — OFFICE VISIT (OUTPATIENT)
Dept: FAMILY MEDICINE CLINIC | Age: 29
End: 2024-12-24

## 2024-12-24 VITALS
BODY MASS INDEX: 40.68 KG/M2 | OXYGEN SATURATION: 99 % | WEIGHT: 290.6 LBS | DIASTOLIC BLOOD PRESSURE: 86 MMHG | RESPIRATION RATE: 12 BRPM | HEART RATE: 96 BPM | SYSTOLIC BLOOD PRESSURE: 130 MMHG | TEMPERATURE: 98.2 F | HEIGHT: 71 IN

## 2024-12-24 DIAGNOSIS — R19.7 DIARRHEA OF PRESUMED INFECTIOUS ORIGIN: ICD-10-CM

## 2024-12-24 DIAGNOSIS — K52.9 ACUTE GASTROENTERITIS: Primary | ICD-10-CM

## 2024-12-24 NOTE — PATIENT INSTRUCTIONS
Thank you   Thank you for trusting us with your healthcare needs. You may receive a survey regarding today's visit. It would help us out if you would take a few moments to provide your feedback. We value your input.  Please bring in ALL medications BOTTLES, including inhalers, herbal supplements, over the counter, prescribed & non-prescribed medicine. The office would like actual medication bottles and a list.         4.  Prior to getting your labs drawn, check with your insurance company for benefits and eligibility of lab services.  Often, insurance companies cover certain tests for preventative visits only.  It is patient's responsibility to    see what is covered.    5.  If the list below has been completed, PLEASE FAX RECORDS TO OUR OFFICE @ [phone]. Once the records have been received we will update your records at our office:  Health Maintenance Due   Topic Date Due    Varicella vaccine (1 of 2 - 13+ 2-dose series) Never done    HIV screen  Never done    Hepatitis C screen  Never done    Flu vaccine (1) 08/01/2024    COVID-19 Vaccine (3 - 2023-24 season) 09/01/2024

## 2024-12-24 NOTE — PROGRESS NOTES
Health Maintenance Due   Topic Date Due    Varicella vaccine (1 of 2 - 13+ 2-dose series) Never done    HIV screen  Never done    Hepatitis C screen  Never done    Flu vaccine (1) 08/01/2024    COVID-19 Vaccine (3 - 2023-24 season) 09/01/2024       
S: 29 y.o. female with   Chief Complaint   Patient presents with    Abdominal Pain     Diarrhea started Thursday at 4 am     Since Thursday.  5 BM today. Hydrating well. Able to eat solids foods. No nausea/vomiting.   No bloody diarrhea. No mucous in her stool.  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  No new foods.   No fever/chills.    Baby 7 months ago   No travel or visits to unusual spots to eat/drink    BP Readings from Last 3 Encounters:   12/24/24 130/86   06/27/24 124/72   05/28/24 132/70     Wt Readings from Last 3 Encounters:   12/24/24 131.8 kg (290 lb 9.6 oz)   06/27/24 (!) 136.3 kg (300 lb 6.4 oz)   05/28/24 135.3 kg (298 lb 3.2 oz)       O: VS:   Vitals:    12/24/24 1005   BP: 130/86   Site: Right Upper Arm   Position: Sitting   Cuff Size: Large Adult   Pulse: 96   Resp: 12   Temp: 98.2 °F (36.8 °C)   TempSrc: Oral   SpO2: 99%   Weight: 131.8 kg (290 lb 9.6 oz)   Height: 1.803 m (5' 10.98\")     Body mass index is 40.55 kg/m².    AAO/NAD, appropriate affect for mood  Normocephalic, atraumatic, eyes - conjunctiva and sclera normal,   skin no rashes on exposed areas   Insight, judgement normal and in no acute distress      Lab Results   Component Value Date    WBC 10.2 05/18/2024    HGB 14.0 06/26/2024    HCT 42.9 06/26/2024     05/18/2024    AST 18 06/26/2024     05/18/2024    K 4.2 05/18/2024     05/18/2024    CREATININE 0.7 05/18/2024    BUN 12 05/18/2024    CO2 25 05/18/2024    TSH 2.400 06/26/2024    LABA1C 5.4 06/26/2024    LABGLOM > 90 05/18/2024    CALCIUM 9.0 05/18/2024       No results found.         Diagnosis Orders   1. Acute gastroenteritis            Plan    [place] diet, good hydration  If continues over a week, then further work up due to risk factors (work exposure possibilities)     Return if symptoms worsen or fail to improve.    Orders Placed:  No orders of the defined types were placed in this encounter.    Medications Prescribed:  No orders of the defined types were 
screen  Never done    Flu vaccine (1) 08/01/2024    COVID-19 Vaccine (3 - 2023-24 season) 09/01/2024    Depression Screen  05/28/2025    Pap smear  10/04/2026    DTaP/Tdap/Td vaccine (5 - Td or Tdap) 03/27/2034    Hepatitis B vaccine  Completed    Hepatitis A vaccine  Aged Out    Hib vaccine  Aged Out    HPV vaccine  Aged Out    Polio vaccine  Aged Out    Meningococcal (ACWY) vaccine  Aged Out    Pneumococcal 0-64 years Vaccine  Aged Out     The ASCVD Risk score (Abel SMALLWOOD, et al., 2019) failed to calculate for the following reasons:    The 2019 ASCVD risk score is only valid for ages 40 to 79        5/28/2024     2:42 PM   PHQ Scores   PHQ2 Score 0   PHQ9 Score 0     Interpretation of Total Score Depression Severity: 1-4 = Minimal depression, 5-9 = Mild depression, 10-14 = Moderate depression, 15-19 = Moderately severe depression, 20-27 = Severe depression     Medications     Current Outpatient Medications   Medication Sig Dispense Refill    SEMAGLUTIDE, 2 MG/DOSE, SC INJECT subcutaneously every week AS DIRECTED      Pantoprazole Sodium (PROTONIX PO) Take by mouth daily      Multiple Vitamins-Minerals (THERAPEUTIC MULTIVITAMIN-MINERALS) tablet Take 1 tablet by mouth daily       No current facility-administered medications for this visit.        ASSESSMENT & PLAN     Acute gastroenteritis  Diarrhea of presumed infectious origin  - Acute, persistent.  5 days of persistent nonbloody diarrhea without fever.  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  At this time, will continue with supportive care including increased hydration, bland soft food, and gradually returning to normal diet as tolerated.  -Patient has lost approximately 10 pounds since her last visit in late June 2024.  She has been on a high-vegetable diet to lose weight since having her baby approximately 7 months ago.  - Plan for GI panel for infectious workup

## 2025-01-09 ENCOUNTER — OFFICE VISIT (OUTPATIENT)
Dept: FAMILY MEDICINE CLINIC | Age: 30
End: 2025-01-09
Payer: COMMERCIAL

## 2025-01-09 VITALS
OXYGEN SATURATION: 99 % | WEIGHT: 293 LBS | SYSTOLIC BLOOD PRESSURE: 118 MMHG | RESPIRATION RATE: 16 BRPM | HEIGHT: 71 IN | HEART RATE: 63 BPM | TEMPERATURE: 98 F | BODY MASS INDEX: 41.02 KG/M2 | DIASTOLIC BLOOD PRESSURE: 76 MMHG

## 2025-01-09 DIAGNOSIS — E66.813 CLASS 3 SEVERE OBESITY DUE TO EXCESS CALORIES WITHOUT SERIOUS COMORBIDITY WITH BODY MASS INDEX (BMI) OF 40.0 TO 44.9 IN ADULT: ICD-10-CM

## 2025-01-09 DIAGNOSIS — Z00.00 ADULT WELLNESS VISIT: Primary | ICD-10-CM

## 2025-01-09 DIAGNOSIS — E66.01 CLASS 3 SEVERE OBESITY DUE TO EXCESS CALORIES WITHOUT SERIOUS COMORBIDITY WITH BODY MASS INDEX (BMI) OF 40.0 TO 44.9 IN ADULT: ICD-10-CM

## 2025-01-09 PROCEDURE — 99395 PREV VISIT EST AGE 18-39: CPT

## 2025-01-09 SDOH — ECONOMIC STABILITY: FOOD INSECURITY: WITHIN THE PAST 12 MONTHS, THE FOOD YOU BOUGHT JUST DIDN'T LAST AND YOU DIDN'T HAVE MONEY TO GET MORE.: NEVER TRUE

## 2025-01-09 SDOH — ECONOMIC STABILITY: FOOD INSECURITY: WITHIN THE PAST 12 MONTHS, YOU WORRIED THAT YOUR FOOD WOULD RUN OUT BEFORE YOU GOT MONEY TO BUY MORE.: NEVER TRUE

## 2025-01-09 ASSESSMENT — PATIENT HEALTH QUESTIONNAIRE - PHQ9
2. FEELING DOWN, DEPRESSED OR HOPELESS: NOT AT ALL
1. LITTLE INTEREST OR PLEASURE IN DOING THINGS: SEVERAL DAYS
SUM OF ALL RESPONSES TO PHQ QUESTIONS 1-9: 1
SUM OF ALL RESPONSES TO PHQ QUESTIONS 1-9: 1
SUM OF ALL RESPONSES TO PHQ9 QUESTIONS 1 & 2: 1
SUM OF ALL RESPONSES TO PHQ QUESTIONS 1-9: 1
SUM OF ALL RESPONSES TO PHQ QUESTIONS 1-9: 1

## 2025-01-09 NOTE — PATIENT INSTRUCTIONS
Thank you   Thank you for trusting us with your healthcare needs. You may receive a survey regarding today's visit. It would help us out if you would take a few moments to provide your feedback. We value your input.  Please bring in ALL medications BOTTLES, including inhalers, herbal supplements, over the counter, prescribed & non-prescribed medicine. The office would like actual medication bottles and a list.         4.  Prior to getting your labs drawn, check with your insurance company for benefits and eligibility of lab services.  Often, insurance companies cover certain tests for preventative visits only.  It is patient's responsibility to    see what is covered.    5.  If the list below has been completed, PLEASE FAX RECORDS TO OUR OFFICE @ [phone]. Once the records have been received we will update your records at our office:  Health Maintenance Due   Topic Date Due    Varicella vaccine (1 of 2 - 13+ 2-dose series) Never done    HIV screen  Never done    Hepatitis C screen  Never done    Flu vaccine (1) 08/01/2024    COVID-19 Vaccine (3 - 2023-24 season) 09/01/2024

## 2025-01-09 NOTE — PROGRESS NOTES
Attending Supervising Physician’s Attestation Statement  The patient met the criteria for indirect supervision.  I discussed the findings and plans with the resident physician and agree as documented in her note .    29 yr old well adult    Electronically signed by Janes Rivrea MD on 1/9/25 at 2:49 PM EST      
Health Maintenance Due   Topic Date Due    Varicella vaccine (1 of 2 - 13+ 2-dose series) Never done    HIV screen  Never done    Hepatitis C screen  Never done    Flu vaccine (1) 08/01/2024    COVID-19 Vaccine (3 - 2023-24 season) 09/01/2024       
Exam  Constitutional:       Appearance: Normal appearance. She is obese.   HENT:      Head: Normocephalic and atraumatic.   Cardiovascular:      Rate and Rhythm: Normal rate and regular rhythm.      Pulses: Normal pulses.      Heart sounds: No murmur heard.     No friction rub. No gallop.   Pulmonary:      Effort: Pulmonary effort is normal. No respiratory distress.      Breath sounds: Normal breath sounds. No wheezing or rales.   Abdominal:      Palpations: Abdomen is soft.      Tenderness: There is no abdominal tenderness.   Musculoskeletal:      Cervical back: Normal range of motion and neck supple.   Skin:     General: Skin is warm and dry.   Neurological:      Mental Status: She is alert and oriented to person, place, and time.   Psychiatric:         Mood and Affect: Mood normal.         Behavior: Behavior normal.         Assessment/Plan:   1. Adult wellness visit  2. Class 3 severe obesity due to excess calories without serious comorbidity with body mass index (BMI) of 40.0 to 44.9 in adult    - UTD on labs  - Received flu vaccine Oct 2024 through work  - Pt receiving Semaglutide through outside provider - states has lost 14 lbs since being prescribed this in Oct 2024. Diet education/ physical activity.  - UTD on pap - done in 2023 by OB/Gyn Dr. Hamilton - results normal  - Pt currently not breastfeeding - discussed ordering lipid panel, pt would like to get today if possible, however pt did eat and explained pt would need to be fasting - elects to get this done at a later date  - Will chart review for HIV/ Hep C likely done as part of prenatal workup, therefore will not need  - F/u in 1 year for wellness            Return in about 1 year (around 1/9/2026).     Electronically signed by Vern Collins DO on 1/12/2025 at 8:15 PM

## 2025-01-31 ENCOUNTER — PATIENT MESSAGE (OUTPATIENT)
Dept: FAMILY MEDICINE CLINIC | Age: 30
End: 2025-01-31

## 2025-02-04 ENCOUNTER — TELEPHONE (OUTPATIENT)
Dept: FAMILY MEDICINE CLINIC | Age: 30
End: 2025-02-04

## 2025-02-04 NOTE — TELEPHONE ENCOUNTER
Janusz Matthew,    Sorry to hear you are experiencing diarrhea. I would advise being seeing in office first (if my schedule is not open feel free to see any other provider if you feel comfortable with that) and at that point can decide whether to place a GI referral.     Let me know if you need anything else.    Best,  Dr. Collins

## 2025-02-04 NOTE — TELEPHONE ENCOUNTER
Patient called back and was wondering if she could change her appointment for tomorrow to a video visit to discuss the GI consult. Please advise.

## 2025-02-04 NOTE — TELEPHONE ENCOUNTER
Called and spoke with patient. Patient scheduled for an appointment on 2/5/2025 with Dr. Collins to discuss GI referral.

## 2025-02-04 NOTE — TELEPHONE ENCOUNTER
That's fine - please let her know I have a full schedule tomorrow and may run behind. If that's the case, I can give her a call before I'm about to join the visit so she doesn't have to wait.

## 2025-02-05 ENCOUNTER — TELEMEDICINE (OUTPATIENT)
Dept: FAMILY MEDICINE CLINIC | Age: 30
End: 2025-02-05
Payer: COMMERCIAL

## 2025-02-05 DIAGNOSIS — R19.7 DIARRHEA OF PRESUMED INFECTIOUS ORIGIN: Primary | ICD-10-CM

## 2025-02-05 PROCEDURE — 99214 OFFICE O/P EST MOD 30 MIN: CPT

## 2025-02-05 NOTE — PROGRESS NOTES
Attending Physician Note    I have seen and evaluated the patient. I discussed the findings, assessment and plan with the resident and agree with the resident's findings and plan as documented in the resident's note.  GC modifier added.

## 2025-02-05 NOTE — PROGRESS NOTES
Tiff DAMEON Castaneda, was evaluated through a synchronous (real-time) audio-video encounter. The patient (or guardian if applicable) is aware that this is a billable service, which includes applicable co-pays. This Virtual Visit was conducted with patient's (and/or legal guardian's) consent. Patient identification was verified, and a caregiver was present when appropriate.   The patient was located at   Provider was located at   Confirm you are appropriately licensed, registered, or certified to deliver care in the state where the patient is located as indicated above. If you are not or unsure, please re-schedule the visit:     Tiff Castaneda (:  1995) is a , presenting virtually for evaluation of the following:      Below is the assessment and plan developed based on review of pertinent history, physical exam, labs, studies, and medications.     Assessment & Plan      No follow-ups on file.       Subjective   HPI  Review of Systems       Objective   Patient-Reported Vitals  No data recorded     Physical Exam  {Virtual visit PE with detailed exam Optional:[id number]}       {Time Documentation Optional:[id number]}    --Vern Collins DO     This resident did not do the visit.  Visit done by Dr. Hayward.

## 2025-02-05 NOTE — PROGRESS NOTES
Tiff Castaneda, was evaluated through a synchronous (real-time) audio-video encounter. The patient (or guardian if applicable) is aware that this is a billable service, which includes applicable co-pays. This Virtual Visit was conducted with patient's (and/or legal guardian's) consent. Patient identification was verified, and a caregiver was present when appropriate.   The patient was located at Home: [address] [place] [address]  Provider was located at Facility (Appt Dept): [address]  [place] [address]  Confirm you are appropriately licensed, registered, or certified to deliver care in the state where the patient is located as indicated above. If you are not or unsure, please re-schedule the visit: Yes, I confirm.     Tiff Castaneda (:  1995) is a Established patient, presenting virtually for evaluation of the following:      Below is the assessment and plan developed based on review of pertinent history, physical exam, labs, studies, and medications.     Assessment & Plan  Diarrhea of presumed infectious origin   New, not at goal (unstable),  Plan to obtain laboratory studies, and infectious workup. Referral to GI    Orders:    CBC with Auto Differential; Future    Comprehensive Metabolic Panel; Future    GI Bacterial Pathogens By PCR; Future    Clostridium Difficile Toxin/Antigen; Future    [facility] - Palmira Abreu MD, Gastroenterology, [place]      No follow-ups on file.       Subjective   Diarrhea - was seen 24.  Told due to diet, but diet hasn't changed.  Wondering about GI referral.    Has been eating bland diet, chicken and rice.  Sx did subside.  ?viral illness  Sx recurred within a month.    No clear association of sx with specific foods.    First episode was watery diarrhea.  Second episode had a bit more consistency, but still very loose.  No recent antibiotic use.  Some cramping, not severe.    Not associated with menses.  No recent travel.  No blood in the stool.  No